# Patient Record
Sex: MALE | Race: WHITE | NOT HISPANIC OR LATINO | Employment: OTHER | URBAN - METROPOLITAN AREA
[De-identification: names, ages, dates, MRNs, and addresses within clinical notes are randomized per-mention and may not be internally consistent; named-entity substitution may affect disease eponyms.]

---

## 2020-09-18 ENCOUNTER — LAB REQUISITION (OUTPATIENT)
Dept: LAB | Facility: HOSPITAL | Age: 65
End: 2020-09-18

## 2020-09-18 DIAGNOSIS — Z00.00 ENCOUNTER FOR GENERAL ADULT MEDICAL EXAMINATION WITHOUT ABNORMAL FINDINGS: ICD-10-CM

## 2020-09-18 PROCEDURE — U0004 COV-19 TEST NON-CDC HGH THRU: HCPCS | Performed by: OPHTHALMOLOGY

## 2020-09-19 LAB — SARS-COV-2 RNA RESP QL NAA+PROBE: NOT DETECTED

## 2022-07-07 ENCOUNTER — PRE-ADMISSION TESTING (OUTPATIENT)
Dept: PREADMISSION TESTING | Facility: HOSPITAL | Age: 67
End: 2022-07-07

## 2022-07-07 ENCOUNTER — HOSPITAL ENCOUNTER (OUTPATIENT)
Dept: GENERAL RADIOLOGY | Facility: HOSPITAL | Age: 67
Discharge: HOME | End: 2022-07-07

## 2022-07-07 VITALS
OXYGEN SATURATION: 97 % | DIASTOLIC BLOOD PRESSURE: 79 MMHG | SYSTOLIC BLOOD PRESSURE: 167 MMHG | HEART RATE: 58 BPM | HEIGHT: 68 IN | WEIGHT: 208.6 LBS | RESPIRATION RATE: 16 BRPM | BODY MASS INDEX: 31.61 KG/M2 | TEMPERATURE: 98.2 F

## 2022-07-07 LAB
ALBUMIN SERPL-MCNC: 4.6 G/DL (ref 3.5–5.2)
ALBUMIN/GLOB SERPL: 2.1 G/DL
ALP SERPL-CCNC: 40 U/L (ref 39–117)
ALT SERPL W P-5'-P-CCNC: 10 U/L (ref 1–41)
ANION GAP SERPL CALCULATED.3IONS-SCNC: 8.4 MMOL/L (ref 5–15)
AST SERPL-CCNC: 11 U/L (ref 1–40)
BILIRUB SERPL-MCNC: <0.2 MG/DL (ref 0–1.2)
BUN SERPL-MCNC: 14 MG/DL (ref 8–23)
BUN/CREAT SERPL: 16.3 (ref 7–25)
CALCIUM SPEC-SCNC: 9.6 MG/DL (ref 8.6–10.5)
CHLORIDE SERPL-SCNC: 104 MMOL/L (ref 98–107)
CO2 SERPL-SCNC: 26.6 MMOL/L (ref 22–29)
CREAT SERPL-MCNC: 0.86 MG/DL (ref 0.76–1.27)
DEPRECATED RDW RBC AUTO: 53.9 FL (ref 37–54)
EGFRCR SERPLBLD CKD-EPI 2021: 94.3 ML/MIN/1.73
ERYTHROCYTE [DISTWIDTH] IN BLOOD BY AUTOMATED COUNT: 20.4 % (ref 12.3–15.4)
GLOBULIN UR ELPH-MCNC: 2.2 GM/DL
GLUCOSE SERPL-MCNC: 135 MG/DL (ref 65–99)
HBA1C MFR BLD: 6.4 % (ref 4.8–5.6)
HCT VFR BLD AUTO: 33.6 % (ref 37.5–51)
HGB BLD-MCNC: 10.1 G/DL (ref 13–17.7)
INR PPP: 1.09 (ref 0.9–1.1)
MCH RBC QN AUTO: 22.8 PG (ref 26.6–33)
MCHC RBC AUTO-ENTMCNC: 30.1 G/DL (ref 31.5–35.7)
MCV RBC AUTO: 75.8 FL (ref 79–97)
PLATELET # BLD AUTO: 431 10*3/MM3 (ref 140–450)
PMV BLD AUTO: 8.6 FL (ref 6–12)
POTASSIUM SERPL-SCNC: 4.4 MMOL/L (ref 3.5–5.2)
PROT SERPL-MCNC: 6.8 G/DL (ref 6–8.5)
PROTHROMBIN TIME: 14 SECONDS (ref 11.7–14.2)
RBC # BLD AUTO: 4.43 10*6/MM3 (ref 4.14–5.8)
SODIUM SERPL-SCNC: 139 MMOL/L (ref 136–145)
WBC NRBC COR # BLD: 8.67 10*3/MM3 (ref 3.4–10.8)

## 2022-07-07 PROCEDURE — 73502 X-RAY EXAM HIP UNI 2-3 VIEWS: CPT

## 2022-07-07 PROCEDURE — 80053 COMPREHEN METABOLIC PANEL: CPT

## 2022-07-07 PROCEDURE — 83036 HEMOGLOBIN GLYCOSYLATED A1C: CPT

## 2022-07-07 PROCEDURE — 85610 PROTHROMBIN TIME: CPT

## 2022-07-07 PROCEDURE — 85027 COMPLETE CBC AUTOMATED: CPT

## 2022-07-07 PROCEDURE — 36415 COLL VENOUS BLD VENIPUNCTURE: CPT

## 2022-07-07 RX ORDER — OMEPRAZOLE 40 MG/1
40 CAPSULE, DELAYED RELEASE ORAL EVERY EVENING
COMMUNITY
Start: 2022-05-12

## 2022-07-07 RX ORDER — LISINOPRIL 30 MG/1
30 TABLET ORAL DAILY
COMMUNITY
Start: 2022-05-31

## 2022-07-07 RX ORDER — CHLORHEXIDINE GLUCONATE 500 MG/1
CLOTH TOPICAL TAKE AS DIRECTED
COMMUNITY
End: 2022-07-21 | Stop reason: HOSPADM

## 2022-07-07 RX ORDER — METOPROLOL SUCCINATE 50 MG/1
50 TABLET, EXTENDED RELEASE ORAL DAILY
COMMUNITY
Start: 2022-02-15 | End: 2023-02-10

## 2022-07-07 RX ORDER — ROSUVASTATIN CALCIUM 40 MG/1
40 TABLET, COATED ORAL NIGHTLY
COMMUNITY
Start: 2022-05-12

## 2022-07-07 RX ORDER — ASPIRIN 81 MG/1
81 TABLET ORAL DAILY
COMMUNITY
End: 2022-07-21 | Stop reason: HOSPADM

## 2022-07-07 RX ORDER — GLIMEPIRIDE 2 MG/1
2 TABLET ORAL
COMMUNITY
Start: 2022-05-12

## 2022-07-07 ASSESSMENT — HOOS JR
HOOS JR SCORE: 18
HOOS JR SCORE: 32.735

## 2022-07-07 NOTE — DISCHARGE INSTRUCTIONS
CHLORHEXIDINE CLOTH INSTRUCTIONS  The morning of surgery follow these instructions using the Chlorhexidine cloths you've been given.  These steps reduce bacteria on the body.  Do not use the cloths near your eyes, ears mouth, genitalia or on open wounds.  Throw the cloths away after use but do not try to flush them down a toilet.      Open and remove one cloth at a time from the package.    Leave the cloth unfolded and begin the bathing.  Massage the skin with the cloths using gentle pressure to remove bacteria.  Do not scrub harshly.   Follow the steps below with one 2% CHG cloth per area (6 total cloths).  One cloth for neck, shoulders and chest.  One cloth for both arms, hands, fingers and underarms (do underarms last).  One cloth for the abdomen followed by groin.  One cloth for right leg and foot including between the toes.  One cloth for left leg and foot including between the toes.  The last cloth is to be used for the back of the neck, back and buttocks.    Allow the CHG to air dry 3 minutes on the skin which will give it time to work and decrease the chance of irritation.  The skin may feel sticky until it is dry.  Do not rinse with water or any other liquid or you will lose the beneficial effects of the CHG.  If mild skin irritation occurs, do rinse the skin to remove the CHG.  Report this to the nurse at time of admission.  Do not apply lotions, creams, ointments, deodorants or perfumes after using the clothes. Dress in clean clothes before coming to the hospital.     Take the following medications the morning of surgery: METOPROLOL    ARRIVE AT 5:15 AM      If you are on prescription narcotic pain medication to control your pain you may also take that medication the morning of surgery.    General Instructions:  Do not eat solid food after midnight the night before surgery.  You may drink clear liquids day of surgery but must stop at least one hour before your hospital arrival time. CUTOFF TIME IS 4:30  AM.  It is beneficial for you to have a clear drink that contains carbohydrates the day of surgery.  We suggest a 12 to 20 ounce bottle of Gatorade or Powerade for non-diabetic patients or a 12 to 20 ounce bottle of G2 or Powerade Zero for diabetic patients. (Pediatric patients, are not advised to drink a 12 to 20 ounce carbohydrate drink)    Clear liquids are liquids you can see through.  Nothing red in color.     Plain water                               Sports drinks  Sodas                                   Gelatin (Jell-O)  Fruit juices without pulp such as white grape juice and apple juice  Popsicles that contain no fruit or yogurt  Tea or coffee (no cream or milk added)  Gatorade / Powerade  G2 / Powerade Zero    Patients who avoid smoking, chewing tobacco and alcohol for 4 weeks prior to surgery have a reduced risk of post-operative complications.  Quit smoking as many days before surgery as you can.  Do not smoke, use chewing tobacco or drink alcohol the day of surgery.   If applicable bring your C-PAP/ BI-PAP machine.  Bring any papers given to you in the doctor’s office.  Wear clean comfortable clothes.  Do not wear contact lenses, false eyelashes or make-up.  Bring a case for your glasses.   Bring crutches or walker if applicable.  Remove all piercings.  Leave jewelry and any other valuables at home.  Hair extensions with metal clips must be removed prior to surgery.  The Pre-Admission Testing nurse will instruct you to bring medications if unable to obtain an accurate list in Pre-Admission Testing.          Preventing a Surgical Site Infection:  For 2 to 3 days before surgery, avoid shaving with a razor because the razor can irritate skin and make it easier to develop an infection.    Any areas of open skin can increase the risk of a post-operative wound infection by allowing bacteria to enter and travel throughout the body.  Notify your surgeon if you have any skin wounds / rashes even if it is not near  the expected surgical site.  The area will need assessed to determine if surgery should be delayed until it is healed.  The night prior to surgery shower using a fresh bar of anti-bacterial soap (such as Dial) and clean washcloth.  Sleep in a clean bed with clean clothing.  Do not allow pets to sleep with you.  Shower on the morning of surgery using a fresh bar of anti-bacterial soap (such as Dial) and clean washcloth.  Dry with a clean towel and dress in clean clothing.  Ask your surgeon if you will be receiving antibiotics prior to surgery.  Make sure you, your family, and all healthcare providers clean their hands with soap and water or an alcohol based hand  before caring for you or your wound.    Day of surgery:  Your arrival time is approximately two hours before your scheduled surgery time.  Upon arrival, a Pre-op nurse and Anesthesiologist will review your health history, obtain vital signs, and answer questions you may have.  The only belongings needed at this time will be a list of your home medications and if applicable your C-PAP/BI-PAP machine.  A Pre-op nurse will start an IV and you may receive medication in preparation for surgery, including something to help you relax.     Please be aware that surgery does come with discomfort.  We want to make every effort to control your discomfort so please discuss any uncontrolled symptoms with your nurse.   Your doctor will most likely have prescribed pain medications.      If you are going home after surgery you will receive individualized written care instructions before being discharged.  A responsible adult must drive you to and from the hospital on the day of your surgery and stay with you for 24 hours.  Discharge prescriptions can be filled by the hospital pharmacy during regular pharmacy hours.  If you are having surgery late in the day/evening your prescription may be e-prescribed to your pharmacy.  Please verify your pharmacy hours or chose a  24 hour pharmacy to avoid not having access to your prescription because your pharmacy has closed for the day.    If you are staying overnight following surgery, you will be transported to your hospital room following the recovery period.  [facility] has all private rooms.    If you have any questions please call Pre-Admission Testing at [phone].  Deductibles and co-payments are collected on the day of service. Please be prepared to pay the required co-pay, deductible or deposit on the day of service as defined by your plan.    Patient Education for Self-Quarantine Process    Following your COVID testing, we strongly recommend that you wear a mask when you are with other people and practice social distancing.   Limit your activities to only required outings.  Wash your hands with soap and water frequently for at least 20 seconds.   Avoid touching your eyes, nose and mouth with unwashed hands.  Do not share anything - utensils, drinking glasses, food from the same bowl.   Sanitize household surfaces daily. Include all high touch areas (door handles, light switches, phones, countertops, etc.)    Call your surgeon immediately if you experience any of the following symptoms:  Sore Throat  Shortness of Breath or difficulty breathing  Cough  Chills  Body soreness or muscle pain  Headache  Fever  New loss of taste or smell  Do not arrive for your surgery ill.  Your procedure will need to be rescheduled to another time.  You will need to call your physician before the day of surgery to avoid any unnecessary exposure to hospital staff as well as other patients.

## 2022-07-08 NOTE — PAT
Eval by APRN in [facility] for sched RTHA becca Buenrostro.  Has had prev TJR with no issues.  Does have h/o CAD with PTCA and placement of drug eluting stent.  Saw cardiology for pre surgical eval and stress test and echo done.  Results in Care Everywhere and cleared by cardiology.  Other PMH includes HTN, DM, hyperlipidemia, reflux, DDD, BPH and OA.  PCP is treating him for some anemia with oral iron currently.  States feeling well overall other than some fatigue which has improved slightly since starting the iron.  Only pain is in his hip.  Denies any sx of recent acute illness.  Exam unremarkable.  EKG and cardiology note/testing reviewed.  Labs continue to show anemia with H/H 10.1/33.6.  Discussed increasing iron to daily (currently taking qod) and taking with citrus to assist absorption.  HgbA1c 6.4.   No contraindication for surgery.

## 2022-07-14 ENCOUNTER — TELEPHONE (OUTPATIENT)
Dept: ORTHOPEDIC SURGERY | Facility: HOSPITAL | Age: 67
End: 2022-07-14

## 2022-07-19 ENCOUNTER — LAB (OUTPATIENT)
Dept: LAB | Facility: HOSPITAL | Age: 67
End: 2022-07-19

## 2022-07-19 LAB — SARS-COV-2 ORF1AB RESP QL NAA+PROBE: NOT DETECTED

## 2022-07-19 PROCEDURE — U0004 COV-19 TEST NON-CDC HGH THRU: HCPCS

## 2022-07-19 PROCEDURE — C9803 HOPD COVID-19 SPEC COLLECT: HCPCS

## 2022-07-19 PROCEDURE — U0005 INFEC AGEN DETEC AMPLI PROBE: HCPCS

## 2022-07-19 NOTE — DISCHARGE PLACEMENT REQUEST
"Reji Max (67 y.o. Male)             Date of Birth   1955    Social Security Number       Address   [address] [address]    Home Phone   [phone]    MRN   [id number]       Lutheran   Hinduism    Marital Status                               Admission Date       Admission Type   Elective    Admitting Provider   Pasquale Buenrostro MD    Attending Provider   Pasquale Buenrostro MD    Department, Room/Bed   [facility] OR, --/--       Discharge Date       Discharge Disposition       Discharge Destination                               Attending Provider: Pasquale Buenrostro MD    Allergies: Erythromycin, Keflex [Cephalexin]    Isolation: None   Infection: COVID Screen (preop/placement) (07/07/22)   Code Status: Not on file   Advance Care Planning Activity    Ht: 172.7 cm (68\")   Wt: 94.6 kg (208 lb 9.6 oz)    Admission Cmt: None   Principal Problem: None                Active Insurance as of 7/20/2022     Primary Coverage     Payor Plan Insurance Group Employer/Plan Group    MEDICARE MEDICARE A & B      Payor Plan Address Payor Plan Phone Number Payor Plan Fax Number Effective Dates    [address] [phone]  4/1/2019 - None Entered    [facility] [id number]       Subscriber Name Subscriber Birth Date Member ID       REJI MAX 1955 [id number]           Secondary Coverage     Payor Plan Insurance Group Employer/Plan Group    [facility] SU[facility] [id number]     Payor Plan Address Payor Plan Phone Number Payor Plan Fax Number Effective Dates    [address]   1/1/2020 - None Entered    [facility] [id number]       Subscriber Name Subscriber Birth Date Member ID       REJI MAX 1955 [id number]                 Emergency Contacts      (Rel.) Home Phone Work Phone Mobile Phone    BRIGHT MAX (Spouse) -- -- [phone]            "

## 2022-07-20 ENCOUNTER — APPOINTMENT (OUTPATIENT)
Dept: GENERAL RADIOLOGY | Facility: HOSPITAL | Age: 67
End: 2022-07-20

## 2022-07-20 ENCOUNTER — HOSPITAL ENCOUNTER (OUTPATIENT)
Facility: HOSPITAL | Age: 67
LOS: 1 days | Discharge: HOME HEALTH | End: 2022-07-21
Attending: ORTHOPAEDIC SURGERY | Admitting: ORTHOPAEDIC SURGERY

## 2022-07-20 ENCOUNTER — ANESTHESIA EVENT (OUTPATIENT)
Dept: PERIOP | Facility: HOSPITAL | Age: 67
End: 2022-07-20

## 2022-07-20 ENCOUNTER — ANESTHESIA (OUTPATIENT)
Dept: PERIOP | Facility: HOSPITAL | Age: 67
End: 2022-07-20

## 2022-07-20 DIAGNOSIS — Z96.641 HISTORY OF TOTAL RIGHT HIP REPLACEMENT: Primary | ICD-10-CM

## 2022-07-20 LAB
GLUCOSE BLDC GLUCOMTR-MCNC: 270 MG/DL (ref 70–130)
GLUCOSE BLDC GLUCOMTR-MCNC: 278 MG/DL (ref 70–130)
HCT VFR BLD AUTO: 31.4 % (ref 37.5–51)
HGB BLD-MCNC: 9.4 G/DL (ref 13–17.7)

## 2022-07-20 PROCEDURE — 97110 THERAPEUTIC EXERCISES: CPT

## 2022-07-20 PROCEDURE — A9270 NON-COVERED ITEM OR SERVICE: HCPCS | Performed by: ORTHOPAEDIC SURGERY

## 2022-07-20 PROCEDURE — 25010000002 DEXAMETHASONE PER 1 MG: Performed by: NURSE ANESTHETIST, CERTIFIED REGISTERED

## 2022-07-20 PROCEDURE — G0378 HOSPITAL OBSERVATION PER HR: HCPCS

## 2022-07-20 PROCEDURE — 25010000002 ONDANSETRON PER 1 MG: Performed by: NURSE ANESTHETIST, CERTIFIED REGISTERED

## 2022-07-20 PROCEDURE — 25010000002 CEFAZOLIN PER 500 MG: Performed by: ORTHOPAEDIC SURGERY

## 2022-07-20 PROCEDURE — 25010000002 KETOROLAC TROMETHAMINE PER 15 MG: Performed by: ORTHOPAEDIC SURGERY

## 2022-07-20 PROCEDURE — 97161 PT EVAL LOW COMPLEX 20 MIN: CPT

## 2022-07-20 PROCEDURE — 25010000002 EPINEPHRINE 1 MG/ML SOLUTION 30 ML VIAL: Performed by: ORTHOPAEDIC SURGERY

## 2022-07-20 PROCEDURE — 97530 THERAPEUTIC ACTIVITIES: CPT

## 2022-07-20 PROCEDURE — 73501 X-RAY EXAM HIP UNI 1 VIEW: CPT

## 2022-07-20 PROCEDURE — 25010000002 CLONIDINE PER 1 MG: Performed by: ORTHOPAEDIC SURGERY

## 2022-07-20 PROCEDURE — 25010000002 ROPIVACAINE PER 1 MG: Performed by: ORTHOPAEDIC SURGERY

## 2022-07-20 PROCEDURE — 63710000001 ASPIRIN 81 MG TABLET DELAYED-RELEASE: Performed by: ORTHOPAEDIC SURGERY

## 2022-07-20 PROCEDURE — 85014 HEMATOCRIT: CPT | Performed by: ORTHOPAEDIC SURGERY

## 2022-07-20 PROCEDURE — C1776 JOINT DEVICE (IMPLANTABLE): HCPCS | Performed by: ORTHOPAEDIC SURGERY

## 2022-07-20 PROCEDURE — 25010000002 MIDAZOLAM PER 1 MG: Performed by: NURSE ANESTHETIST, CERTIFIED REGISTERED

## 2022-07-20 PROCEDURE — 63710000001 INSULIN LISPRO (HUMAN) PER 5 UNITS: Performed by: ORTHOPAEDIC SURGERY

## 2022-07-20 PROCEDURE — 25010000002 CEFAZOLIN IN DEXTROSE 2-4 GM/100ML-% SOLUTION: Performed by: ORTHOPAEDIC SURGERY

## 2022-07-20 PROCEDURE — 25010000002 FENTANYL CITRATE (PF) 50 MCG/ML SOLUTION: Performed by: NURSE ANESTHETIST, CERTIFIED REGISTERED

## 2022-07-20 PROCEDURE — 85018 HEMOGLOBIN: CPT | Performed by: ORTHOPAEDIC SURGERY

## 2022-07-20 PROCEDURE — 76000 FLUOROSCOPY <1 HR PHYS/QHP: CPT

## 2022-07-20 PROCEDURE — 63710000001 HYDROCODONE-ACETAMINOPHEN 7.5-325 MG TABLET: Performed by: ORTHOPAEDIC SURGERY

## 2022-07-20 PROCEDURE — 82962 GLUCOSE BLOOD TEST: CPT

## 2022-07-20 PROCEDURE — 25010000002 PROPOFOL 10 MG/ML EMULSION: Performed by: NURSE ANESTHETIST, CERTIFIED REGISTERED

## 2022-07-20 DEVICE — DEV CONTRL TISS STRATAFIX SPIRAL MNCRYL UD 3/0 PLS 30CM: Type: IMPLANTABLE DEVICE | Site: HIP | Status: FUNCTIONAL

## 2022-07-20 DEVICE — STEM FEM HIP TAPERLOC COMPL MICROPLASTY HI OFFST SZ11: Type: IMPLANTABLE DEVICE | Site: HIP | Status: FUNCTIONAL

## 2022-07-20 DEVICE — SHLL ACET OSSEOTI G7 4H SZF 56MM: Type: IMPLANTABLE DEVICE | Site: HIP | Status: FUNCTIONAL

## 2022-07-20 DEVICE — IMPLANTABLE DEVICE
Type: IMPLANTABLE DEVICE | Site: HIP | Status: FUNCTIONAL
Brand: BIOLOX® DELTA MODULAR CERAMIC HEAD

## 2022-07-20 DEVICE — TOTAL HIP PRIMARY: Type: IMPLANTABLE DEVICE | Site: HIP | Status: FUNCTIONAL

## 2022-07-20 DEVICE — DEV CONTRL TISS STRATAFIX SYMM PDS PLUS VIL CT-1 45CM: Type: IMPLANTABLE DEVICE | Site: HIP | Status: FUNCTIONAL

## 2022-07-20 DEVICE — LINER ACET G7 VIVACIT/E NTRL HXPE SZF 36MM: Type: IMPLANTABLE DEVICE | Site: HIP | Status: FUNCTIONAL

## 2022-07-20 DEVICE — CP HIP UPCHRG OSSEOTI LTD HL CUPS: Type: IMPLANTABLE DEVICE | Site: HIP | Status: FUNCTIONAL

## 2022-07-20 RX ORDER — LABETALOL HYDROCHLORIDE 5 MG/ML
5 INJECTION, SOLUTION INTRAVENOUS
Status: DISCONTINUED | OUTPATIENT
Start: 2022-07-20 | End: 2022-07-20 | Stop reason: HOSPADM

## 2022-07-20 RX ORDER — ONDANSETRON 2 MG/ML
4 INJECTION INTRAMUSCULAR; INTRAVENOUS ONCE AS NEEDED
Status: DISCONTINUED | OUTPATIENT
Start: 2022-07-20 | End: 2022-07-20 | Stop reason: HOSPADM

## 2022-07-20 RX ORDER — ASPIRIN 81 MG/1
81 TABLET ORAL EVERY 12 HOURS SCHEDULED
Status: DISCONTINUED | OUTPATIENT
Start: 2022-07-20 | End: 2022-07-21 | Stop reason: HOSPADM

## 2022-07-20 RX ORDER — FENTANYL CITRATE 50 UG/ML
INJECTION, SOLUTION INTRAMUSCULAR; INTRAVENOUS AS NEEDED
Status: DISCONTINUED | OUTPATIENT
Start: 2022-07-20 | End: 2022-07-20 | Stop reason: SURG

## 2022-07-20 RX ORDER — FENTANYL CITRATE 50 UG/ML
50 INJECTION, SOLUTION INTRAMUSCULAR; INTRAVENOUS
Status: DISCONTINUED | OUTPATIENT
Start: 2022-07-20 | End: 2022-07-20 | Stop reason: HOSPADM

## 2022-07-20 RX ORDER — HYDROCODONE BITARTRATE AND ACETAMINOPHEN 7.5; 325 MG/1; MG/1
1 TABLET ORAL EVERY 4 HOURS PRN
Status: DISCONTINUED | OUTPATIENT
Start: 2022-07-20 | End: 2022-07-21 | Stop reason: HOSPADM

## 2022-07-20 RX ORDER — HYDROCODONE BITARTRATE AND ACETAMINOPHEN 7.5; 325 MG/1; MG/1
1 TABLET ORAL EVERY 6 HOURS PRN
Qty: 50 TABLET | Refills: 0 | Status: SHIPPED | OUTPATIENT
Start: 2022-07-20

## 2022-07-20 RX ORDER — TRAMADOL HYDROCHLORIDE 50 MG/1
50 TABLET ORAL EVERY 8 HOURS PRN
Status: DISCONTINUED | OUTPATIENT
Start: 2022-07-20 | End: 2022-07-21 | Stop reason: HOSPADM

## 2022-07-20 RX ORDER — HYDROCODONE BITARTRATE AND ACETAMINOPHEN 7.5; 325 MG/1; MG/1
2 TABLET ORAL EVERY 4 HOURS PRN
Status: DISCONTINUED | OUTPATIENT
Start: 2022-07-20 | End: 2022-07-21 | Stop reason: HOSPADM

## 2022-07-20 RX ORDER — HYDROCODONE BITARTRATE AND ACETAMINOPHEN 5; 325 MG/1; MG/1
1 TABLET ORAL ONCE AS NEEDED
Status: COMPLETED | OUTPATIENT
Start: 2022-07-20 | End: 2022-07-20

## 2022-07-20 RX ORDER — MIDAZOLAM HYDROCHLORIDE 1 MG/ML
INJECTION INTRAMUSCULAR; INTRAVENOUS AS NEEDED
Status: DISCONTINUED | OUTPATIENT
Start: 2022-07-20 | End: 2022-07-20 | Stop reason: SURG

## 2022-07-20 RX ORDER — BUPIVACAINE HYDROCHLORIDE 7.5 MG/ML
INJECTION, SOLUTION EPIDURAL; RETROBULBAR
Status: COMPLETED | OUTPATIENT
Start: 2022-07-20 | End: 2022-07-20

## 2022-07-20 RX ORDER — DEXAMETHASONE SODIUM PHOSPHATE 4 MG/ML
INJECTION, SOLUTION INTRA-ARTICULAR; INTRALESIONAL; INTRAMUSCULAR; INTRAVENOUS; SOFT TISSUE AS NEEDED
Status: DISCONTINUED | OUTPATIENT
Start: 2022-07-20 | End: 2022-07-20 | Stop reason: SURG

## 2022-07-20 RX ORDER — SODIUM CHLORIDE 0.9 % (FLUSH) 0.9 %
3-10 SYRINGE (ML) INJECTION AS NEEDED
Status: DISCONTINUED | OUTPATIENT
Start: 2022-07-20 | End: 2022-07-20 | Stop reason: HOSPADM

## 2022-07-20 RX ORDER — LIDOCAINE HYDROCHLORIDE 10 MG/ML
0.5 INJECTION, SOLUTION EPIDURAL; INFILTRATION; INTRACAUDAL; PERINEURAL ONCE AS NEEDED
Status: DISCONTINUED | OUTPATIENT
Start: 2022-07-20 | End: 2022-07-20 | Stop reason: HOSPADM

## 2022-07-20 RX ORDER — CEFADROXIL 500 MG/1
500 CAPSULE ORAL 2 TIMES DAILY
Qty: 3 CAPSULE | Refills: 0 | Status: SHIPPED | OUTPATIENT
Start: 2022-07-20

## 2022-07-20 RX ORDER — NALOXONE HCL 0.4 MG/ML
0.4 VIAL (ML) INJECTION AS NEEDED
Status: DISCONTINUED | OUTPATIENT
Start: 2022-07-20 | End: 2022-07-20 | Stop reason: HOSPADM

## 2022-07-20 RX ORDER — DEXTROSE MONOHYDRATE 25 G/50ML
25 INJECTION, SOLUTION INTRAVENOUS
Status: DISCONTINUED | OUTPATIENT
Start: 2022-07-20 | End: 2022-07-21 | Stop reason: HOSPADM

## 2022-07-20 RX ORDER — MIDAZOLAM HYDROCHLORIDE 1 MG/ML
0.5 INJECTION INTRAMUSCULAR; INTRAVENOUS
Status: DISCONTINUED | OUTPATIENT
Start: 2022-07-20 | End: 2022-07-20 | Stop reason: HOSPADM

## 2022-07-20 RX ORDER — OXYCODONE AND ACETAMINOPHEN 7.5; 325 MG/1; MG/1
1 TABLET ORAL ONCE AS NEEDED
Status: DISCONTINUED | OUTPATIENT
Start: 2022-07-20 | End: 2022-07-20 | Stop reason: HOSPADM

## 2022-07-20 RX ORDER — PREGABALIN 75 MG/1
150 CAPSULE ORAL ONCE
Status: COMPLETED | OUTPATIENT
Start: 2022-07-20 | End: 2022-07-20

## 2022-07-20 RX ORDER — SODIUM CHLORIDE 0.9 % (FLUSH) 0.9 %
3 SYRINGE (ML) INJECTION EVERY 12 HOURS SCHEDULED
Status: DISCONTINUED | OUTPATIENT
Start: 2022-07-20 | End: 2022-07-20 | Stop reason: HOSPADM

## 2022-07-20 RX ORDER — DIPHENHYDRAMINE HYDROCHLORIDE 50 MG/ML
12.5 INJECTION INTRAMUSCULAR; INTRAVENOUS
Status: DISCONTINUED | OUTPATIENT
Start: 2022-07-20 | End: 2022-07-20 | Stop reason: HOSPADM

## 2022-07-20 RX ORDER — INSULIN LISPRO 100 [IU]/ML
0-7 INJECTION, SOLUTION INTRAVENOUS; SUBCUTANEOUS
Status: DISCONTINUED | OUTPATIENT
Start: 2022-07-20 | End: 2022-07-21 | Stop reason: HOSPADM

## 2022-07-20 RX ORDER — TRANEXAMIC ACID 100 MG/ML
INJECTION, SOLUTION INTRAVENOUS AS NEEDED
Status: DISCONTINUED | OUTPATIENT
Start: 2022-07-20 | End: 2022-07-20 | Stop reason: SURG

## 2022-07-20 RX ORDER — PROPOFOL 10 MG/ML
VIAL (ML) INTRAVENOUS CONTINUOUS PRN
Status: DISCONTINUED | OUTPATIENT
Start: 2022-07-20 | End: 2022-07-20 | Stop reason: SURG

## 2022-07-20 RX ORDER — INSULIN LISPRO 100 [IU]/ML
0-7 INJECTION, SOLUTION INTRAVENOUS; SUBCUTANEOUS
Status: DISCONTINUED | OUTPATIENT
Start: 2022-07-21 | End: 2022-07-20

## 2022-07-20 RX ORDER — HYDROMORPHONE HYDROCHLORIDE 1 MG/ML
0.25 INJECTION, SOLUTION INTRAMUSCULAR; INTRAVENOUS; SUBCUTANEOUS
Status: DISCONTINUED | OUTPATIENT
Start: 2022-07-20 | End: 2022-07-20 | Stop reason: HOSPADM

## 2022-07-20 RX ORDER — ONDANSETRON 4 MG/1
4 TABLET, FILM COATED ORAL ONCE AS NEEDED
Status: DISCONTINUED | OUTPATIENT
Start: 2022-07-20 | End: 2022-07-21 | Stop reason: HOSPADM

## 2022-07-20 RX ORDER — ENALAPRILAT 2.5 MG/2ML
1.25 INJECTION INTRAVENOUS ONCE AS NEEDED
Status: DISCONTINUED | OUTPATIENT
Start: 2022-07-20 | End: 2022-07-20 | Stop reason: HOSPADM

## 2022-07-20 RX ORDER — ACETAMINOPHEN 500 MG
1000 TABLET ORAL ONCE
Status: COMPLETED | OUTPATIENT
Start: 2022-07-20 | End: 2022-07-20

## 2022-07-20 RX ORDER — TRAMADOL HYDROCHLORIDE 50 MG/1
50 TABLET ORAL EVERY 8 HOURS PRN
Qty: 40 TABLET | Refills: 0 | Status: SHIPPED | OUTPATIENT
Start: 2022-07-20

## 2022-07-20 RX ORDER — CEFAZOLIN SODIUM 2 G/100ML
2 INJECTION, SOLUTION INTRAVENOUS ONCE
Status: COMPLETED | OUTPATIENT
Start: 2022-07-20 | End: 2022-07-20

## 2022-07-20 RX ORDER — NICOTINE POLACRILEX 4 MG
15 LOZENGE BUCCAL
Status: DISCONTINUED | OUTPATIENT
Start: 2022-07-20 | End: 2022-07-21 | Stop reason: HOSPADM

## 2022-07-20 RX ORDER — ONDANSETRON 4 MG/1
4 TABLET, FILM COATED ORAL EVERY 8 HOURS PRN
Qty: 12 TABLET | Refills: 0 | Status: SHIPPED | OUTPATIENT
Start: 2022-07-20

## 2022-07-20 RX ORDER — CELECOXIB 200 MG/1
200 CAPSULE ORAL ONCE
Status: COMPLETED | OUTPATIENT
Start: 2022-07-20 | End: 2022-07-20

## 2022-07-20 RX ORDER — CEFAZOLIN SODIUM 2 G/100ML
2 INJECTION, SOLUTION INTRAVENOUS EVERY 8 HOURS
Status: COMPLETED | OUTPATIENT
Start: 2022-07-20 | End: 2022-07-21

## 2022-07-20 RX ORDER — SODIUM CHLORIDE, SODIUM LACTATE, POTASSIUM CHLORIDE, CALCIUM CHLORIDE 600; 310; 30; 20 MG/100ML; MG/100ML; MG/100ML; MG/100ML
9 INJECTION, SOLUTION INTRAVENOUS CONTINUOUS
Status: DISCONTINUED | OUTPATIENT
Start: 2022-07-20 | End: 2022-07-21 | Stop reason: HOSPADM

## 2022-07-20 RX ORDER — FAMOTIDINE 10 MG/ML
20 INJECTION, SOLUTION INTRAVENOUS ONCE
Status: COMPLETED | OUTPATIENT
Start: 2022-07-20 | End: 2022-07-20

## 2022-07-20 RX ORDER — ASPIRIN 81 MG/1
81 TABLET ORAL 2 TIMES DAILY
Qty: 60 TABLET | Refills: 0 | Status: SHIPPED | OUTPATIENT
Start: 2022-07-20 | End: 2022-08-19

## 2022-07-20 RX ORDER — CELECOXIB 100 MG/1
100 CAPSULE ORAL 2 TIMES DAILY
Qty: 60 CAPSULE | Refills: 0 | Status: SHIPPED | OUTPATIENT
Start: 2022-07-20

## 2022-07-20 RX ORDER — ONDANSETRON 2 MG/ML
INJECTION INTRAMUSCULAR; INTRAVENOUS AS NEEDED
Status: DISCONTINUED | OUTPATIENT
Start: 2022-07-20 | End: 2022-07-20 | Stop reason: SURG

## 2022-07-20 RX ADMIN — FAMOTIDINE 20 MG: 10 INJECTION INTRAVENOUS at 06:35

## 2022-07-20 RX ADMIN — INSULIN LISPRO 4 UNITS: 100 INJECTION, SOLUTION INTRAVENOUS; SUBCUTANEOUS at 21:53

## 2022-07-20 RX ADMIN — PREGABALIN 150 MG: 75 CAPSULE ORAL at 05:48

## 2022-07-20 RX ADMIN — DEXAMETHASONE SODIUM PHOSPHATE 8 MG: 4 INJECTION, SOLUTION INTRAMUSCULAR; INTRAVENOUS at 07:49

## 2022-07-20 RX ADMIN — TRANEXAMIC ACID 1000 MG: 1 INJECTION, SOLUTION INTRAVENOUS at 09:10

## 2022-07-20 RX ADMIN — FENTANYL CITRATE 50 MCG: 0.05 INJECTION, SOLUTION INTRAMUSCULAR; INTRAVENOUS at 07:04

## 2022-07-20 RX ADMIN — ONDANSETRON 4 MG: 2 INJECTION INTRAMUSCULAR; INTRAVENOUS at 09:22

## 2022-07-20 RX ADMIN — PROPOFOL 80 MCG/KG/MIN: 10 INJECTION, EMULSION INTRAVENOUS at 07:32

## 2022-07-20 RX ADMIN — BUPIVACAINE HYDROCHLORIDE 1.4 ML: 7.5 INJECTION, SOLUTION EPIDURAL; RETROBULBAR at 07:29

## 2022-07-20 RX ADMIN — SODIUM CHLORIDE, POTASSIUM CHLORIDE, SODIUM LACTATE AND CALCIUM CHLORIDE: 600; 310; 30; 20 INJECTION, SOLUTION INTRAVENOUS at 07:01

## 2022-07-20 RX ADMIN — HYDROCODONE BITARTRATE AND ACETAMINOPHEN 1 TABLET: 5; 325 TABLET ORAL at 15:44

## 2022-07-20 RX ADMIN — TRANEXAMIC ACID 1000 MG: 1 INJECTION, SOLUTION INTRAVENOUS at 07:50

## 2022-07-20 RX ADMIN — MIDAZOLAM 2 MG: 1 INJECTION INTRAMUSCULAR; INTRAVENOUS at 07:09

## 2022-07-20 RX ADMIN — CEFAZOLIN SODIUM 2 G: 2 INJECTION, SOLUTION INTRAVENOUS at 07:00

## 2022-07-20 RX ADMIN — CELECOXIB 200 MG: 200 CAPSULE ORAL at 05:48

## 2022-07-20 RX ADMIN — ASPIRIN 81 MG: 81 TABLET, COATED ORAL at 21:53

## 2022-07-20 RX ADMIN — ACETAMINOPHEN 1000 MG: 500 TABLET ORAL at 05:48

## 2022-07-20 RX ADMIN — CEFAZOLIN 2 G: 10 INJECTION, POWDER, FOR SOLUTION INTRAVENOUS at 21:41

## 2022-07-20 RX ADMIN — HYDROCODONE BITARTRATE AND ACETAMINOPHEN 1 TABLET: 7.5; 325 TABLET ORAL at 22:01

## 2022-07-20 RX ADMIN — FENTANYL CITRATE 50 MCG: 0.05 INJECTION, SOLUTION INTRAMUSCULAR; INTRAVENOUS at 07:25

## 2022-07-20 NOTE — DISCHARGE INSTRUCTIONS
Dr. Pasquale Buenrostro  Anterior Total Hip Replacement Discharge Instructions:    Office Phone Number: [phone]    I. ACTIVITIES:  1. Exercises:  Complete exercise program as taught by the hospital physical therapist 2 times per day.  You may wean off the walker to a cane when directed by the physical therapist.  Exercise program will be advanced by the physical therapist  During the day be up ambulating every 2 hours (while awake) for short distances  Complete the ankle pump exercises at least 10 times per hour (while awake)  Elevate legs most of the day the first week post operatively and thereafter elevate legs when in bed and for at least 30 minutes during the day. Use cold packs 20-30 minutes approximately 5 times per day. This should be done before and after completing your exercises and at any time you are experiencing pain/ stiffness in your operative extremity.      2. Activities of Daily Living:  No tub baths, hot tubs, or swimming pools for 4 weeks  The clear dressing with thin white gauze strip dressing is waterproof.  You may shower without covering the dressing beginning 3 days after the operation.  After 7 days you may remove the dressing.  If the dressing becomes saturated prior to day 7, it may be changed.  After dressing removal, do not scrub or rub the incision. Allow skin glue to fall off over the next few weeks.  After the dressing is removed, simply let the water run over the incision and pat dry.    II. Restrictions  Follow any movement restrictions that was discussed with you by either Dr. Buenrostro or the physical therapist.     Dr. Buenrostro will discuss with you when you will be able to drive again at your first post-op appointment.  Weight bearing is as tolerated.  First week stay inside on even terrain. May go up and down stairs one stair at a time utilizing the hand rail.  Once you feel confident, you may venture outside.    III. Precautions:  Everyone that comes near you should wash  their hands  No elective dental, genital-urinary, or colon procedures or surgical procedures for 12 weeks after surgery unless absolutely necessary.   If dental work or surgical procedure is deemed absolutely necessary within 12 weeks of surgery, you will need to contact Dr. Buenrostro's office as you will need to take antibiotics 1 hour prior to any dental work (including teeth cleanings).  Dr. Buenrostro will prescribe prophylactic antibiotics for all dental procedures for one year  as a precautionary measure to minimize risk of infection.  If you are a diabetic or take immunosuppressive medication, you may have to take prophylactic antibiotics the remainder of your life before dental work.    Avoid sick people. If you must be around someone who is ill, they should wear a mask.  Avoid visits to the Emergency Room or Urgent Care unless you are having a life threatening event.   If you have leg swelling you may wear leg compression stocking.   Stockings are to be placed on in the morning and removed at night. Monitor the stockings to ensure that any swelling is not causing the stockings to become too tight. In this case, remove stockings immediately.    IV. INCISION CARE:  Dr. Buenrostro takes great care in closing your incision to give you the best opportunity for a healthy incision with minimal scarring. He places sutures below the skin surface that will eventually dissolve.  The incision is then covered with a skin glue which makes the incision water tight, and minimizes bleeding onto the dressing.  No staples are used.  Occasionally one of the buried stitches may come to the skin surface and may need to be removed.  Please resist the temptation of removing the stitch by yourself.   will be happy to remove it for you.  Bruising around the incision and thigh is normal and to be expected.  Please keep dressing in place at least until post-op day 7. You may remove and replace dressing before day 7 if the dressing  begins to fall off or becomes saturated. Wash your hands and under your finger nails prior to dressing changes.  After day 7 as long as incision is dry and intact, you may leave the dressing off and open to air.  You will need to find underwear that does not rub on the incision for a few weeks after the surgery.  You may find it more comfortable to place a dressing on to keep your underwear from rubbing the incision.       If dressing must be changed, utilize dry gauze and paper tape. Avoid touching the side of the gauze that goes against the incision with your hands.  No creams or ointments to the incision until permission given by Dr. Buenrostro.  Do not touch or pick at the incision, or try to remove any sutures or skin glue.  Check dressing every day and notify surgeon immediately if any of the following signs or symptoms are noted:  Increase in redness  Increase in swelling of the entire extremity that does not go away with elevation.  Notify office that you may have a blood clot.    Drainage oozing from the incision  Pulling apart of the edges of the incision  Increase in overall body temperature (greater than 100.5 degrees)    V. Medications:   1. Anticoagulants: You will be discharged on an anticoagulant. This is a prophylactic medication that helps prevent blood clots during your post-operative period. The type and length of dosage varies based on your individual needs, procedure performed, and Dr. Buenrostro's preference.  While taking the anticoagulant, you should avoid taking any additional aspirin than what is prescribed.   Notify surgeon immediately if any romulo bleeding is noted in the urine, stool, emesis, or from the nose or the incision. Blood in the stool will often appear as black rather than red. Blood in urine may appear as pink. Blood in emesis may appear as brown/black like coffee grounds.  You will need to apply pressure for longer periods of time to any cuts or abrasions to stop bleeding  Avoid  alcohol while taking anticoagulants.    2. Stool Softeners: You will be at greater risk of constipation after surgery due to being less mobile and the pain medications.   Take stool softeners as instructed by your surgeon while on pain medications. Over the counter Colace 100 mg 1-2 capsules twice daily.   If stools become too loose or too frequent, please decreases the dosage or stop the stool softener.  If constipation occurs despite use of stool softeners, you are to continue the stool softeners and add a laxative (Milk of Magnesia 1 ounce daily as needed).  If no bowel movement occurs past 3 days, then purchase Magnesium citrate and drink 1/2 bottle every 8 hours (on ice tastes better) until success. If no bowel movement by post-operative day 5 please call Dr. Penn office for further instructions.   You may need to decrease or stop your pain medications if bowel movements to not occur.     Drink plenty of fluids, and eat fruits and vegetables during your recovery time.    3. Pain Medications utilized after surgery are narcotics and the law requires that the following information be given to all patients that are prescribed narcotics:  CLASSIFICATION: Pain medications are called Opioids and are narcotics  LEGALITIES: It is illegal to share narcotics with others and to drive within 24 hours of taking narcotics  POTENTIAL SIDE EFFECTS: Potential side effects of opioids include: nausea, vomiting, itching, dizziness, drowsiness, dry mouth, constipation, and difficulty urinating.  POTENTIAL ADVERSE EFFECTS:   Opioid tolerance can develop with use of pain medications and this simply means that it requires more and more of the medication to control pain; however, this is seen more in patients that use opioids for longer periods of time.  Opioid dependence can develop with use of Opioids and this simply means that to stop the medication can cause withdrawal symptoms; however, this is seen with patients that use  "Opioids for longer periods of time.  Opioid addiction can develop with use of Opioids and the incidence of this is very unlikely in patients who take the medications as ordered and stop the medications as instructed.  Opioid overdose can be dangerous, but is unlikely when the medication is taken as ordered and stopped when ordered. It is important not to mix opioids with alcohol or with and type of sedative such as Benadryl as this can lead to over sedation and respiratory difficulty.  DOSAGE:   Pain medications will need to be taken consistently for the first few days to decrease pain and promote adequate pain relief and participation in physical therapy.  After the initial surgical pain begins to resolve, you may begin to decrease the pain medication. By the end of 6 weeks, you should be off of pain medications except for before physical therapy or to help with pain when attempting to fall asleep.  Pain medications will be tapered to lesser dosages as you are further from your surgical day.  No pain medications will be provided after 3 months from surgery.     Refills will not be given by the office during evening hours, or weekends.  To seek refills on pain medications during the post-operative period, you must call the office 48 hours in advance to request the refill. The office will then notify you when to  the prescription. DO NOT wait until you are out of the medication to request a refill.  They can not be \"called in\" to the pharmacy.      How to Wean Off Pain Medication:   As you begin to feel better, gradually wean off the narcotic pain medication and begin to use it only for breakthrough pain.  Gradually reduce the total number of pills you take each day.  This can be done by taking fewer pills at a time or by increasing the amount of time between each pill.    For example, if you were taking 2 pills every 6 hours you would be taking a total of 8 pills per day.  Reduce this to 6 pills per day, then " 4-5 and so on.  This can be done by taking 1 pill at a time instead of 2, or by taking the pills every 8 hours instead of every 6.    As you begin to wean from the narcotic pain medication, begin substituting with over the counter tylenol when you are not taking the narcotic.  Limit total tylenol dosage to less than 4 grams per day.    V. FOLLOW-UP VISITS:  You will need to follow up in the office with Dr. Buenrostro at 3 weeks.  Please call [phone] if you need to confirm or reschedule your appointment time.   If you have any concerns or suspected complications prior to your follow up visit, please call your surgeons office. Do not wait until your appointment time if you suspect complications. These will need to be addressed in the office promptly.

## 2022-07-20 NOTE — ANESTHESIA POSTPROCEDURE EVALUATION
Patient: Reji Max Sr.    Procedure Summary     Date: 07/20/22 Room / Location: [facility] OR [address] [place] [address] [facility]    Anesthesia Start: 0700 Anesthesia Stop: 0945    Procedure: TOTAL HIP ARTHROPLASTY ANTERIOR WITH HANA TABLE (Right Hip) Diagnosis:     Surgeons: Pasquale Buenrostro MD Provider: Luis Felipe Sagastume MD    Anesthesia Type: spinal ASA Status: 3          Anesthesia Type: spinal    Vitals  Vitals Value Taken Time   /72 07/20/22 1030   Temp 36.6 °C (97.8 °F) 07/20/22 1036   Pulse 54 07/20/22 1045   Resp 16 07/20/22 1036   SpO2 97 % 07/20/22 1045   Vitals shown include unvalidated device data.        Post Anesthesia Care and Evaluation    Patient location during evaluation: bedside  Patient participation: complete - patient participated  Level of consciousness: awake and alert  Pain score: 0  Pain management: adequate    Airway patency: patent  Anesthetic complications: No anesthetic complications    Cardiovascular status: acceptable  Respiratory status: acceptable  Hydration status: acceptable    Comments: /75 (BP Location: Left arm, Patient Position: Lying)   Pulse 56   Temp 36.6 °C (97.8 °F) (Oral)   Resp 18   SpO2 98%

## 2022-07-20 NOTE — ANESTHESIA PREPROCEDURE EVALUATION
Anesthesia Evaluation     Patient summary reviewed and Nursing notes reviewed   NPO Solid Status: > 8 hours  NPO Liquid Status: > 2 hours           Airway   Mallampati: II  TM distance: >3 FB  Neck ROM: full  Dental - normal exam     Pulmonary - negative pulmonary ROS and normal exam    breath sounds clear to auscultation  Cardiovascular - normal exam    Patient on routine beta blocker  Rhythm: regular  Rate: normal    (+) hypertension 2 medications or greater, past MI  >12 months, CAD, hyperlipidemia,   (-) angina, orthopnea, PND, POSADA      Neuro/Psych- negative ROS  GI/Hepatic/Renal/Endo    (+) obesity,  GERD,  renal disease stones, diabetes mellitus,     Musculoskeletal     Abdominal    Substance History - negative use     OB/GYN negative ob/gyn ROS         Other   arthritis,                      Anesthesia Plan    ASA 3     spinal       Anesthetic plan, risks, benefits, and alternatives have been provided, discussed and informed consent has been obtained with: patient.        CODE STATUS:

## 2022-07-20 NOTE — PLAN OF CARE
Goal Outcome Evaluation:  Plan of Care Reviewed With: patient           Outcome Evaluation: Pt seen again today to attempt transfers and ambulation. Pt continues to have no quad activation s/p spinal block. Pt unable to perform quad set or SLR and unable to bear any weight onto RLE without knee buckling. PT blocking R knee for safety in standing. Remains unsafe to ambulate at this time. PT recommending followup with PT tomorrow before d/c home with assist and HH.

## 2022-07-20 NOTE — THERAPY EVALUATION
Acute Care - Physical Therapy Initial Evaluation  [facility]     Patient Name: Reji Max Sr.  : 1955  MRN: [id number]  Today's Date: 2022   Onset of Illness/Injury or Date of Surgery: 22  Visit Dx:     ICD-10-CM ICD-9-CM   1. History of total right hip replacement  Z96.641 V43.64     There is no problem list on file for this patient.    Past Medical History:   Diagnosis Date   • BPH (benign prostatic hyperplasia)    • DDD (degenerative disc disease), lumbar    • Diabetes mellitus (HCC)    • Elevated cholesterol    • GERD (gastroesophageal reflux disease)    • History of viral meningitis    • Hypertension    • Kidney stone     MICROSCOPIC HEMATURIA   • Myocardial infarction (HCC)     X 2   • Osteoarthritis of right hip      Past Surgical History:   Procedure Laterality Date   • CARDIAC CATHETERIZATION      MULTIPLE   • CORONARY ANGIOPLASTY WITH STENT PLACEMENT     • MINIMALLY INVASIVE MICRODISCECTOMY LUMBAR SPINE     • TONSILLECTOMY     • TOTAL KNEE ARTHROPLASTY Left    • TURP / TRANSURETHRAL INCISION / DRAINAGE PROSTATE       PT Assessment (last 12 hours)     PT Evaluation and Treatment     Row Name 22 1400          Physical Therapy Time and Intention    Subjective Information no complaints  -     Document Type evaluation  -     Mode of Treatment individual therapy;physical therapy  -     Patient Effort excellent  -     Symptoms Noted During/After Treatment none  -     Comment s/p spinal, pt has minimal R quad control during PT eval- RN aware and observed   -     Row Name 22 1400          General Information    Patient Profile Reviewed yes  -     Onset of Illness/Injury or Date of Surgery 22  -     Referring Physician Porter  -     Patient Observations alert;agree to therapy;cooperative  -     General Observations of Patient pt supine on stretcher no acute distress  -     Prior Level of Function independent:  -     Equipment Currently Used at Home  none  -     Pertinent History of Current Functional Problem POD 0 R ant KATIE  -     Existing Precautions/Restrictions hip, anterior;right;fall  -     Equipment Issued to Patient walker, front wheeled  -     Risks Reviewed patient:  -     Benefits Reviewed patient:  -[facility] Name 07/20/22 1400          Pain    Pretreatment Pain Rating 0/10 - no pain  -     Posttreatment Pain Rating 0/10 - no pain  -[facility] Name 07/20/22 1400          Cognition    Affect/Mental Status (Cognition) WFL  -     Orientation Status (Cognition) oriented x 4  -     Follows Commands (Cognition) WNL  -[facility] Name 07/20/22 1400          Range of Motion Comprehensive    Comment, General Range of Motion impaired R knee extension- unable to perform against gravity due to quad weakness, otherwise WNL  -[facility] Name 07/20/22 1400          Strength Comprehensive (MMT)    Comment, General Manual Muscle Testing (MMT) Assessment RLE significantly impaired due to spinal, quad 1/5, otherwise WFL, LLE 5/5  -[facility] Name 07/20/22 1400          Mobility    Extremity Weight-bearing Status right lower extremity  -     Right Lower Extremity (Weight-bearing Status) weight-bearing as tolerated (WBAT)  -[facility] Name 07/20/22 1400          Bed Mobility    Bed Mobility supine-sit;sit-supine  -     Supine-Sit [place] (Bed Mobility) minimum assist (75% patient effort)  -     Sit-Supine [place] (Bed Mobility) minimum assist (75% patient effort)  -     Comment, (Bed Mobility) dependent assist of RLE due to weakness post spinal  -[facility] Name 07/20/22 1400          Transfers    Transfers stand-sit transfer;sit-stand transfer  -     Comment, (Transfers) guarding/blocking R knee due to poor quad control  -     Sit-Stand [place] (Transfers) minimum assist (75% patient effort);verbal cues;nonverbal cues (demo/gesture)  -     Stand-Sit [place] (Transfers) minimum assist (75% patient effort);verbal  cues;nonverbal cues (demo/gesture)  -     Row Name 07/20/22 1400          Sit-Stand Transfer    Assistive Device (Sit-Stand Transfers) walker, front-wheeled  -     Row Name 07/20/22 1400          Stand-Sit Transfer    Assistive Device (Stand-Sit Transfers) walker, front-wheeled  -LH     Row Name 07/20/22 1400          Gait/Stairs (Locomotion)    Comment, (Gait/Stairs) unable to assess  -     Row Name 07/20/22 1400          Balance    Balance Assessment sitting static balance;standing static balance  -     Static Sitting Balance standby assist  -     Position, Sitting Balance unsupported;sitting edge of bed  -     Static Standing Balance minimal assist;verbal cues;non-verbal cues (demo/gesture)  guarding RLE due to quad weakness  -     Position/Device Used, Standing Balance walker, front-wheeled  -     Row Name 07/20/22 1400          Motor Skills    Therapeutic Exercise --  KATIE therex x 5 reps w assist (unable to activate quad)  -     Row Name             Wound 07/20/22 0825 Right anterior hip Incision    Wound - Properties Group Placement Date: 07/20/22  -BC Placement Time: 0825  -BC Side: Right  -BC Orientation: anterior  -BC Location: hip  -BC Primary Wound Type: Incision  -BC     Retired Wound - Properties Group Placement Date: 07/20/22  -BC Placement Time: 0825  -BC Side: Right  -BC Orientation: anterior  -BC Location: hip  -BC Primary Wound Type: Incision  -BC     Retired Wound - Properties Group Date first assessed: 07/20/22  -BC Time first assessed: 0825  -BC Side: Right  -BC Location: hip  -BC Primary Wound Type: Incision  -BC     Row Name 07/20/22 1400          Plan of Care Review    Plan of Care Reviewed With patient  -     Outcome Evaluation Pt 68 yo male POD 0 R ant KATIE s/p spinal, pt w noted minimal quad activation limiting safe mobilization during exam. Pt stood bedside stretcher Min A rwx- PT guarding/blocking RLE to prevent buckling. Pt WBAT however performing minimal WB RLE due  to quad weakness, unable to safely ambulate. Pt completed KATIE therex w assist for performance, PROM LAQ again due to quad weakness. Pt educated on anterior THP. Pt may benefit from skilled PT acutely to address deficits, rwx delivered. RN bedside and observed PT session.  -     Row Name 07/20/22 1400          Positioning and Restraints    Pre-Treatment Position in bed  -     Post Treatment Position bed  -     In Bed fowlers;call light within reach;encouraged to call for assist;notified nsg  stretcher  -     Row Name 07/20/22 1400          Therapy Assessment/Plan (PT)    Rehab Potential (PT) good, to achieve stated therapy goals  -     Criteria for Skilled Interventions Met (PT) yes  -     Therapy Frequency (PT) daily  -Count includes the [facility] Name 07/20/22 1400          PT Evaluation Complexity    History, PT Evaluation Complexity 1-2 personal factors and/or comorbidities  -     Examination of Body Systems (PT Eval Complexity) 1-2 elements  -     Clinical Presentation (PT Evaluation Complexity) evolving  -     Clinical Decision Making (PT Evaluation Complexity) moderate complexity  -     Overall Complexity (PT Evaluation Complexity) low complexity  -     Row Name 07/20/22 1400          Physical Therapy Goals    Gait Training Goal Selection (PT) gait training, PT goal 1  -     Stairs Goal Selection (PT) stairs, PT goal 1  -Count includes the [facility] Name 07/20/22 1400          Gait Training Goal 1 (PT)    Activity/Assistive Device (Gait Training Goal 1, PT) gait (walking locomotion);walker, rolling  -     [place] Level (Gait Training Goal 1, PT) modified independence  -     Distance (Gait Training Goal 1, PT) 80  -     Time Frame (Gait Training Goal 1, PT) 1 week  -Count includes the [facility] Name 07/20/22 1400          Stairs Goal 1 (PT)    Activity/Assistive Device (Stairs Goal 1, PT) descending stairs;ascending stairs  -     [place] Level/Cues Needed (Stairs Goal 1, PT) modified independence  -     Number of Stairs  (Stairs Goal 1, PT) 4  -     Time Frame (Stairs Goal 1, PT) 1 week  -           User Key  (r) = Recorded By, (t) = Taken By, (c) = Cosigned By    Initials Name Provider Type     Rebecca Russo, PT Physical Therapist    Elizabeth Canela, RN Registered Nurse                Physical Therapy Education                 Title: PT OT SLP Therapies (In Progress)     Topic: Physical Therapy (In Progress)     Point: Mobility training (In Progress)     Learning Progress Summary           Patient Acceptance, E, NR by  at 7/20/2022 1416                   Point: Home exercise program (In Progress)     Learning Progress Summary           Patient Acceptance, E, NR by  at 7/20/2022 1416                   Point: Body mechanics (In Progress)     Learning Progress Summary           Patient Acceptance, E, NR by  at 7/20/2022 1416                   Point: Precautions (In Progress)     Learning Progress Summary           Patient Acceptance, E, NR by  at 7/20/2022 1416                               User Key     Initials Effective Dates Name Provider Type Discipline     06/16/21 -  Rebecca Russo, PT Physical Therapist PT              PT Recommendation and Plan  Anticipated Discharge Disposition (PT): home with home health  Planned Therapy Interventions (PT): balance training, bed mobility training, gait training, home exercise program, ROM (range of motion), stair training, strengthening, stretching, transfer training  Therapy Frequency (PT): daily  Plan of Care Reviewed With: patient  Outcome Evaluation: Pt 66 yo male POD 0 R ant KATIE s/p spinal, pt w noted minimal quad activation limiting safe mobilization during exam. Pt stood bedside stretcher Min A rwx- PT guarding/blocking RLE to prevent buckling. Pt WBAT however performing minimal WB RLE due to quad weakness, unable to safely ambulate. Pt completed KATIE therex w assist for performance, PROM LAQ again due to quad weakness. Pt educated on anterior THP. Pt may benefit from  skilled PT acutely to address deficits, rwx delivered. RN bedside and observed PT session.   Outcome Measures     Row Name 07/20/22 1400             How much help from another person do you currently need...    Turning from your back to your side while in flat bed without using bedrails? 3  -LH      Moving from lying on back to sitting on the side of a flat bed without bedrails? 3  -LH      Moving to and from a bed to a chair (including a wheelchair)? 2  -LH      Standing up from a chair using your arms (e.g., wheelchair, bedside chair)? 2  -LH      Climbing 3-5 steps with a railing? 1  -LH      To walk in hospital room? 1  -      AM-PAC 6 Clicks Score (PT) 12  -              Functional Assessment    Outcome Measure Options AM-PAC 6 Clicks Basic Mobility (PT)  -            User Key  (r) = Recorded By, (t) = Taken By, (c) = Cosigned By    Initials Name Provider Type     Rebecca Russo, PT Physical Therapist                 Time Calculation:    PT Charges     Row Name 07/20/22 1418 07/20/22 1417          Time Calculation    Start Time 1150  - 1345  -     Stop Time 1200  - 1408  -     Time Calculation (min) 10 min  - 23 min  -     PT Received On -- 07/20/22  -     PT - Next Appointment -- 07/21/22  -     PT Goal Re-Cert Due Date -- 07/27/22  -            Time Calculation- PT    Total Timed Code Minutes- PT -- 15 minute(s)  -           User Key  (r) = Recorded By, (t) = Taken By, (c) = Cosigned By    Initials Name Provider Type     Rebecca Russo, PT Physical Therapist              Therapy Charges for Today     Code Description Service Date Service Provider Modifiers Qty    99082753785 HC PT EVAL LOW COMPLEXITY 2 7/20/2022 Rebecca Russo, PT GP 1    55365137879 HC PT THER PROC EA 15 MIN 7/20/2022 Rebecca Russo, PT GP 1    19551812492 HC PT THERAPEUTIC ACT EA 15 MIN 7/20/2022 Rebecca Russo, PT GP 1          PT G-Codes  Outcome Measure Options: AM-PAC 6 Clicks Basic Mobility (PT)  AM-PAC 6  Clicks Score (PT): 12    Rebecca Russo, PT  7/20/2022

## 2022-07-20 NOTE — THERAPY TREATMENT NOTE
Patient Name: Reji Max Sr.  : 1955    MRN: [id number]                              Today's Date: 2022       Admit Date: 2022    Visit Dx:     ICD-10-CM ICD-9-CM   1. History of total right hip replacement  Z96.641 V43.64     Patient Active Problem List   Diagnosis   • History of total right hip replacement     Past Medical History:   Diagnosis Date   • BPH (benign prostatic hyperplasia)    • DDD (degenerative disc disease), lumbar    • Diabetes mellitus (HCC)    • Elevated cholesterol    • GERD (gastroesophageal reflux disease)    • History of viral meningitis    • Hypertension    • Kidney stone     MICROSCOPIC HEMATURIA   • Myocardial infarction (HCC)     X 2   • Osteoarthritis of right hip      Past Surgical History:   Procedure Laterality Date   • CARDIAC CATHETERIZATION      MULTIPLE   • CORONARY ANGIOPLASTY WITH STENT PLACEMENT     • MINIMALLY INVASIVE MICRODISCECTOMY LUMBAR SPINE     • TONSILLECTOMY     • TOTAL KNEE ARTHROPLASTY Left    • TURP / TRANSURETHRAL INCISION / DRAINAGE PROSTATE        General Information     Row Name 22 1721          Physical Therapy Time and Intention    Document Type therapy note (daily note)  -CF     Mode of Treatment physical therapy;individual therapy  -CF     Row Name 22 172          General Information    Patient Profile Reviewed yes  -CF     Existing Precautions/Restrictions hip, anterior;right;fall  -CF     Row Name 22 1721          Cognition    Orientation Status (Cognition) oriented x 4  -CF           User Key  (r) = Recorded By, (t) = Taken By, (c) = Cosigned By    Initials Name Provider Type    CF Darlyn Claros PT Physical Therapist               Mobility     Row Name 22 1721          Bed Mobility    Bed Mobility supine-sit;sit-supine  -CF     Supine-Sit [place] (Bed Mobility) minimum assist (75% patient effort)  -CF     Sit-Supine [place] (Bed Mobility) minimum assist (75% patient effort)  -CF     Comment,  (Bed Mobility) at rle  -     Row Name 07/20/22 1721          Sit-Stand Transfer    Sit-Stand [place] (Transfers) minimum assist (75% patient effort);verbal cues;nonverbal cues (demo/gesture)  -     Assistive Device (Sit-Stand Transfers) walker, front-wheeled  -CF     Comment, (Sit-Stand Transfer) R knee blocked, [place] with any WS to R LE  -     Row Name 07/20/22 1721          Mobility    Extremity Weight-bearing Status right lower extremity  -CF     Right Lower Extremity (Weight-bearing Status) weight-bearing as tolerated (WBAT)  -           User Key  (r) = Recorded By, (t) = Taken By, (c) = Cosigned By    Initials Name Provider Type     Darlyn Claros PT Physical Therapist               Obj/Interventions     Row Name 07/20/22 1721          Motor Skills    Therapeutic Exercise other (see comments)  encouraged ankle pumps  -           User Key  (r) = Recorded By, (t) = Taken By, (c) = Cosigned By    Initials Name Provider Type     Darlyn Claros PT Physical Therapist               Goals/Plan    No documentation.                Clinical Impression     Row Name 07/20/22 1722          Pain    Pretreatment Pain Rating 0/10 - no pain  -     Posttreatment Pain Rating 0/10 - no pain  -CF     Row Name 07/20/22 1722          Plan of Care Review    Plan of Care Reviewed With patient  -CF     Outcome Evaluation Pt seen again today to attempt transfers and ambulation. Pt continues to have no quad activation s/p spinal block. Pt unable to perform quad set or SLR and unable to bear any weight onto RLE without knee buckling. PT blocking R knee for safety in standing. Remains unsafe to ambulate at this time. PT recommending followup with PT tomorrow before d/c home with assist and HH.  -     Row Name 07/20/22 1722          Vital Signs    O2 Delivery Pre Treatment room air  -     Row Name 07/20/22 1722          Positioning and Restraints    Pre-Treatment Position in bed  -CF     Post Treatment  Position bed  -CF     In Bed notified nsg;call light within reach;encouraged to call for assist;fowlers;with family/caregiver  -CF           User Key  (r) = Recorded By, (t) = Taken By, (c) = Cosigned By    Initials Name Provider Type    CF Darlyn Claros, PT Physical Therapist               Outcome Measures     Row Name 07/20/22 1724 07/20/22 1400       How much help from another person do you currently need...    Turning from your back to your side while in flat bed without using bedrails? 3  -CF 3  -LH    Moving from lying on back to sitting on the side of a flat bed without bedrails? 3  -CF 3  -LH    Moving to and from a bed to a chair (including a wheelchair)? 2  -CF 2  -LH    Standing up from a chair using your arms (e.g., wheelchair, bedside chair)? 2  -CF 2  -LH    Climbing 3-5 steps with a railing? 1  -CF 1  -LH    To walk in hospital room? 1  -CF 1  -LH    AM-PAC 6 Clicks Score (PT) 12  -CF 12  -LH    Highest level of mobility 4 --> Transferred to chair/commode  -CF 4 --> Transferred to chair/commode  -LH    Row Name 07/20/22 1724 07/20/22 1400       Functional Assessment    Outcome Measure Options AM-PAC 6 Clicks Basic Mobility (PT)  -CF AM-PAC 6 Clicks Basic Mobility (PT)  -          User Key  (r) = Recorded By, (t) = Taken By, (c) = Cosigned By    Initials Name Provider Type     Rebecca Russo, PT Physical Therapist    Darlyn Bridges, PAULY Physical Therapist                             Physical Therapy Education                 Title: PT OT SLP Therapies (Done)     Topic: Physical Therapy (Done)     Point: Mobility training (Done)     Learning Progress Summary           Patient Acceptance, E, VU,NR by  at 7/20/2022 1724    Acceptance, E, NR by  at 7/20/2022 1416                   Point: Home exercise program (Done)     Learning Progress Summary           Patient Acceptance, E, VU,NR by  at 7/20/2022 1724    Acceptance, E, NR by  at 7/20/2022 1416                   Point: Body mechanics  (Done)     Learning Progress Summary           Patient Acceptance, E, VU,NR by  at 7/20/2022 1724    Acceptance, E, NR by  at 7/20/2022 1416                   Point: Precautions (Done)     Learning Progress Summary           Patient Acceptance, E, VU,NR by  at 7/20/2022 1724    Acceptance, E, NR by  at 7/20/2022 1416                               User Key     Initials Effective Dates Name Provider Type Discipline     06/16/21 -  Rebecca Russo, PT Physical Therapist PT     06/16/21 -  Darlyn Claros PT Physical Therapist PT              PT Recommendation and Plan     Plan of Care Reviewed With: patient  Outcome Evaluation: Pt seen again today to attempt transfers and ambulation. Pt continues to have no quad activation s/p spinal block. Pt unable to perform quad set or SLR and unable to bear any weight onto RLE without knee buckling. PT blocking R knee for safety in standing. Remains unsafe to ambulate at this time. PT recommending followup with PT tomorrow before d/c home with assist and HH.     Time Calculation:    PT Charges     Row Name 07/20/22 1720 07/20/22 1418 07/20/22 1417       Time Calculation    Start Time 1702  - 1150  - 1345  -    Stop Time 1720  - 1200  - 1408  -    Time Calculation (min) 18 min  - 10 min  - 23 min  -    PT Received On 07/20/22  - -- 07/20/22  [facility]    PT - Next Appointment 07/21/22  - -- 07/21/22  -    PT Goal Re-Cert Due Date -- -- 07/27/22  -       Time Calculation- PT    Total Timed Code Minutes- PT -- -- 15 minute(s)  -          User Key  (r) = Recorded By, (t) = Taken By, (c) = Cosigned By    Initials Name Provider Type     Rebecca Russo, PT Physical Therapist     Darlyn Claros, PAULY Physical Therapist              Therapy Charges for Today     Code Description Service Date Service Provider Modifiers Qty    [id number]  PT THERAPEUTIC ACT EA 15 MIN 7/20/2022 Darlyn Claros, PT GP 1          PT G-Codes  Outcome Measure Options:  AM-PAC 6 Clicks Basic Mobility (PT)  -[facility] 6 Clicks Score (PT): 12    Darlyn Claros, PT  7/20/2022

## 2022-07-20 NOTE — PLAN OF CARE
Goal Outcome Evaluation:  Plan of Care Reviewed With: patient           Outcome Evaluation: Pt 68 yo male POD 0 R ant KATIE s/p spinal, pt w noted minimal quad activation limiting safe mobilization during exam. Pt stood bedside stretcher Min A rwx- PT guarding/blocking RLE to prevent buckling. Pt WBAT however performing minimal WB RLE due to quad weakness, unable to safely ambulate. Pt completed KATIE therex w assist for performance, PROM LAQ again due to quad weakness. Pt educated on anterior THP. Pt may benefit from skilled PT acutely to address deficits, rwx delivered. RN bedside and observed PT session.    .Patient was wearing a face mask during this therapy encounter. Therapist used appropriate personal protective equipment including eye protection, mask, and gloves.  Mask used was standard procedure mask. Appropriate PPE was worn during the entire therapy session. Hand hygiene was completed before and after therapy session. Patient is not in enhanced droplet precautions.

## 2022-07-20 NOTE — OP NOTE
Right TOTAL HIP ARTHROPLASTY ANTERIOR WITH HANA TABLE  Procedure Note    Reji Max Sr.  7/20/2022    Pre-op Diagnosis: Right hip osteoarthritis.   Post-op Diagnosis: Same  Procedure:  Right total hip arthroplasty, anterior approach  Surgeon:  Pasquale Buenrostro MD  Assistant: Mitchell Slade PA-C  The services of a first assist were necessary for performing the procedure safely and expeditiously.  The first assist was present for the entire duration of the case and helped with positioning, retraction and closure of the incision.    Anesthesia: Spinal, Anesthesiologist: Luis Felipe Sagastume MD  CRNA: Ines Julian CRNA  Staff: Circulator: Lilia Brownlee RN; Elizabeth Weathers RN  Scrub Person: Gavin Corea CFA  Estimated Blood Loss: 200ml  Specimens:   Order Name Source Comment Collection Info Order Time   HEMOGLOBIN AND HEMATOCRIT, BLOOD    7/20/2022  6:53 AM     Release to patient   Immediate          Drains: none  Complications: None    Components Utilized:    Implant Name Type Inv. Item Serial No.  Lot No. LRB No. Used Action   DEV CONTRL TISS STRATAFIX SPIRAL MNCRYL UD 3/0 PLS 30CM - [id number] Implant DEV CONTRL TISS STRATAFIX SPIRAL MNCRYL UD 3/0 PLS 30CM  [employer] . Right 1 Implanted   DEV CONTRL TISS STRATAFIX SYMM PDS PLUS ERASTO CT-1 45CM - [id number] Implant DEV CONTRL TISS STRATAFIX SYMM PDS PLUS ERASTO CT-1 45CM  [employer]  [employer] . Right 1 Implanted   SHLL ACET OSSEOTI G7 4H SZF 56MM - [id number] Implant SHLL ACET OSSEOTI G7 4H SZF 56MM  [employer] [id number] Right 1 Implanted   LINER ACET G7 VIVACIT/E NTRL HXPE SZF 36MM - [id number] Implant LINER ACET G7 VIVACIT/E NTRL HXPE SZF 36MM  [employer] [id number] Right 1 Implanted   STEM FEM HIP TAPERLOC COMPL MICROPLASTY HI OFFST SZ11 - [id number] Implant STEM FEM HIP TAPERLOC COMPL MICROPLASTY HI OFFST SZ11  [employer] [id number] Right 1 Implanted   HD MOD FEM/HIP G7 BIOLOX/DELTA TYP1 CERAM  36MM MIN3 - [id number] Implant HD MOD FEM/HIP G7 BIOLOX/DELTA TYP1 CERAM 36MM MIN3  [employer] [id number] Right 1 Implanted       Indication for Procedure:  This patient is a 67 y.o. male who has failed conservative treatment for management of arthritis of the operative hip.  Surgical options and non-surgical options were discussed in detail and to the patient's satisfaction.  Surgical intervention was recommended based on the patient's injury and functional status.      The risks and benefits of surgery were discussed with patient and informed consent was obtained.  Risks include but are not limited to, infection, bleeding, nerve injury, blood clots, risks associated with anesthesia, need for further surgery, persistent pain, and possibly death. The risk of leg length discrepancy was also discussed with the patient.    Protocols for intravenous antibiotics and venous thrombosis were followed for this patient.  IV antibiotics were infused prior to surgery and will be discontinued within 24 hours of completion of the surgical procedure.         DESCRIPTION OF PROCEDURE:     The patient was seen in preoperative area where their surgical site was marked. Preoperative antibiotics were received. H&P and consent updated. They were taken to the Operating Room and provided the aforementioned anesthetic on the hospital bed.  The patient was then transferred to the hand operating table in the supine position.  Traction boots were applied.  The perineal post was placed.  All bony prominences were well-padded.  At this point the extremity is prepped and draped in usual sterile fashion using alcohol followed by ChloraPrep.  Next a formal surgical timeout was carried out.  All members of the team were in agreement.  At this point the anterior superior iliac spine was marked out and an incision extending from about to 2 cm lateral and 1 cm distal to the anterior superior iliac spine was extended in the longitudinal fashion through  the skin and subcutaneous tissue with a 10 blade scalpel.  The fascia overlying the tensor fascia hallie muscle belly was identified and sharply incised.  The tensor fascia was reflected laterally and an Yue-Alexander retractor was placed.  Next the myofascia of the rectus femoris was incised using the Bovie and the rectus was reflected medially.  The reflected head of the rectus was partially released.  Next the floor of the rectus femoris fascia was incised the ascending branch lateral femoral circumflex vessels were identified and cauterized using the bipolar cautery device.  Next the pericapsular fat pad was incised and the iliocapsularis was reflected medially.  The superior and inferior extracapsular retractors were placed.  Next Bovie electrocautery was used to incise the capsule making an L-shaped flap.  The retractors were placed intracapsularly.  The intertrochanteric tubercle was identified and helped to identify the level of the femoral neck osteotomy compared with preoperative templating.  Next a sagittal saw was used to make the osteotomy in the femoral neck.  The extremity was placed on traction and gentle external rotation. The corkscrew was used to remove the femoral head.  The femoral head was measured on the back table.  Next the traction was taken off the pubofemoral ligament was identified and released off the proximal femur down to the level of the lesser trochanter. The femur was then rotated externally to 90° and the remnant of the pubofemoral  ligament was released.  Next the mini Charnley retractor was placed intracapsularly to expose the acetabulum.  10 blade scalpel was used to remove the labral remnant.  The Bovie electrocautery was used to remove the ligament of teres and pulvinar.  Next C-arm imaging was used to make an AP pelvis as well as AP hip.  The acetabulum was sequentially reamed up to a 1 size smaller than the definitive implant.  The acetabulum was exceptionally sclerotic.  I  ended up reaming down to a size 52 mm reamer and used sharp 52 and 54 mm reamers to begin medializing.  Even with this, the bone was extremely sclerotic and actually started smoking when reaming.  I eventually used a curette to start to denude some of the sclerotic bone.  I then went back to the reamers and was able to finally reaming medialize in appropriate fashion.  The implant was impacted receiving excellent purchase.  No screws were utilized given the excellent purchase..  A definitive polyethylene liner was placed.  The acetabular component did have coverage anteriorly.  Overhanging inferior osteophytes were removed.  Final imaging of the acetabular component was assessed and determined to be appropriate inclination as well as version.  Next, attention was directed towards the femur.  The femoral lift was placed lateral to the vastus lateralis.  The femur was placed in 90° of external rotation.  A longitudinal incision was made in the posterior capsule and the ischiofemoral ligament.  This was incised to the level of the fat pad between the capsule and the gluteus medius and minimus.  A trochanteric retractor was placed here.  Next further release was performed reflecting the piriformis posteriorly over the trochanter.  The obturator externus was maintained as was the piriformis.  The conjoined tendon was reflected over the greater trochanter, maintaining its insertion.  The femur was delivered through the capsular rent and dropped to the floor. The femur was placed in external rotation.  The box osteotome was used to open the femur followed by a rasp.  The femur was then sequentially broached up to the size of the definitive implant,  achieving excellent fixation.  A trial neck and head was placed in accordance with preoperative templating and intraoperative imaging.  The hip was reduced and had excellent stability at 90° with lateral traction with the bone.  C-arm images were again obtained and interischial  line technique using a metal drop vaughn was utilized to compare the operative extremity to the contralateral side in regards to leg length as well as offset.  The hip was dislocated again and definitive components were seleted and impacted into placed.  Of note, after impacting the femoral stem it was noted that the tip of the  had snapped off into the lateral shoulder of the stem.  I used some small Shelby osteotomes to remove the remaining metallic fragment of the  which did take about an additional 20 minutes.  I was able to remove all residual fractured instrument from the femoral stem.  There was no evidence of intraoperative fracture.  The hip was again imaged to confirm stem position. This remained stable with 90 degrees of external rotation and lateral traction with the bone hook.  Next the wound was irrigated copiously sterile saline via pulsatile saline lavage with 0.35% betadine.  Periarticular injection was placed in the iliocapsularis as well as rectus femoris and tensor fascia hallie muscle bellies.  The capsule was not closed however was laid back over the anterior aspect of the neck.  Next the fascia overlying the tensor fascia hallie was closed using 0 Vicryl suture in running fashion.  Careful attention was paid towards avoiding the lateral femoral cutaneous nerve during the closure.  Subcutaneous layer was closed in 2-0 Vicryl suture interrupted inverted fashion followed by 3-0 Monocryl for the subcuticular layer.  Dermabond and a sterile occlusive dressing were applied.  All counts were correct at the end of the procedure.  The patient was awakened in satisfactory condition and transferred to the postoperative care unit.    Postoperative Plan:     The patient will receive  Aspirin for DVT prophylaxis for 30 days post operatively  The patient will be weight bearing as tolerated with no hip precautions.    Plan will be for discharge to home with home health care today     Pasquale Buenrostro MD

## 2022-07-20 NOTE — CASE MANAGEMENT/SOCIAL WORK
Continued Stay Note  [facility]     Patient Name: Reji Max Sr.  MRN: [id number]  Today's Date: 7/20/2022    Admit Date: 7/20/2022     Discharge Plan     Row Name 07/20/22 1233       Plan    Plan Home with [facility]               Discharge Codes    No documentation.               Expected Discharge Date and Time     Expected Discharge Date Expected Discharge Time    Jul 20, 2022             Shannon Epley, RN

## 2022-07-20 NOTE — NURSING NOTE
Pt reports taking last dose of aspirin yesterday morning @0900. States he forgot he was told to hold it for 5 days prior to surgery. Dr. Buenrostro informed. MD states ok to proceed with scheduled procedure as planned.

## 2022-07-20 NOTE — ANESTHESIA PROCEDURE NOTES
Spinal Block      Patient reassessed immediately prior to procedure    Patient location during procedure: OR  Start Time: 7/20/2022 7:18 AM  Stop Time: 7/20/2022 7:29 AM  Indication:at surgeon's request  Performed By  Anesthesiologist: Luis Felipe Sagastume MD  Preanesthetic Checklist  Completed: patient identified, IV checked, site marked, risks and benefits discussed, surgical consent, monitors and equipment checked, pre-op evaluation and timeout performed  Spinal Block Prep:  Patient Position:sitting  Sterile Tech:cap, gloves, mask and sterile barriers  Prep:Chloraprep  Patient Monitoring:blood pressure monitoring and continuous pulse oximetry  Spinal Block Procedure  Approach:midline  Guidance:landmark technique  Location:L3-L4  Needle Type:Pam  Needle Gauge:25 G  Placement of Spinal needle event:cerebrospinal fluid aspirated  Paresthesia: no  Fluid Appearance:clear  Medications: bupivacaine PF (MARCAINE) 0.75 % injection, 1.4 mL  Med Administered at 7/20/2022 7:29 AM   Post Assessment  Patient Tolerance:patient tolerated the procedure well with no apparent complications  Complications no

## 2022-07-21 VITALS
DIASTOLIC BLOOD PRESSURE: 70 MMHG | HEART RATE: 69 BPM | RESPIRATION RATE: 16 BRPM | TEMPERATURE: 97.2 F | OXYGEN SATURATION: 96 % | SYSTOLIC BLOOD PRESSURE: 141 MMHG

## 2022-07-21 LAB
GLUCOSE BLDC GLUCOMTR-MCNC: 180 MG/DL (ref 70–130)
HCT VFR BLD AUTO: 31.3 % (ref 37.5–51)
HGB BLD-MCNC: 9.7 G/DL (ref 13–17.7)

## 2022-07-21 PROCEDURE — 63710000001 GLIPIZIDE 5 MG TABLET: Performed by: ORTHOPAEDIC SURGERY

## 2022-07-21 PROCEDURE — G0378 HOSPITAL OBSERVATION PER HR: HCPCS

## 2022-07-21 PROCEDURE — 97116 GAIT TRAINING THERAPY: CPT

## 2022-07-21 PROCEDURE — A9270 NON-COVERED ITEM OR SERVICE: HCPCS | Performed by: ORTHOPAEDIC SURGERY

## 2022-07-21 PROCEDURE — 85014 HEMATOCRIT: CPT | Performed by: ORTHOPAEDIC SURGERY

## 2022-07-21 PROCEDURE — 63710000001 INSULIN LISPRO (HUMAN) PER 5 UNITS: Performed by: ORTHOPAEDIC SURGERY

## 2022-07-21 PROCEDURE — 25010000002 CEFAZOLIN PER 500 MG: Performed by: ORTHOPAEDIC SURGERY

## 2022-07-21 PROCEDURE — 63710000001 METOPROLOL SUCCINATE XL 50 MG TABLET SUSTAINED-RELEASE 24 HOUR: Performed by: ORTHOPAEDIC SURGERY

## 2022-07-21 PROCEDURE — 63710000001 ASPIRIN 81 MG TABLET DELAYED-RELEASE: Performed by: ORTHOPAEDIC SURGERY

## 2022-07-21 PROCEDURE — 63710000001 HYDROCODONE-ACETAMINOPHEN 7.5-325 MG TABLET: Performed by: ORTHOPAEDIC SURGERY

## 2022-07-21 PROCEDURE — 82962 GLUCOSE BLOOD TEST: CPT

## 2022-07-21 PROCEDURE — 85018 HEMOGLOBIN: CPT | Performed by: ORTHOPAEDIC SURGERY

## 2022-07-21 PROCEDURE — 97110 THERAPEUTIC EXERCISES: CPT

## 2022-07-21 RX ORDER — GLIPIZIDE 5 MG/1
5 TABLET ORAL
Status: DISCONTINUED | OUTPATIENT
Start: 2022-07-21 | End: 2022-07-21 | Stop reason: HOSPADM

## 2022-07-21 RX ORDER — PANTOPRAZOLE SODIUM 40 MG/1
40 TABLET, DELAYED RELEASE ORAL EVERY MORNING
Status: DISCONTINUED | OUTPATIENT
Start: 2022-07-21 | End: 2022-07-21 | Stop reason: HOSPADM

## 2022-07-21 RX ORDER — METOPROLOL SUCCINATE 50 MG/1
50 TABLET, EXTENDED RELEASE ORAL DAILY
Status: DISCONTINUED | OUTPATIENT
Start: 2022-07-21 | End: 2022-07-21 | Stop reason: HOSPADM

## 2022-07-21 RX ORDER — ROSUVASTATIN CALCIUM 40 MG/1
40 TABLET, COATED ORAL NIGHTLY
Status: DISCONTINUED | OUTPATIENT
Start: 2022-07-21 | End: 2022-07-21 | Stop reason: HOSPADM

## 2022-07-21 RX ADMIN — INSULIN LISPRO 2 UNITS: 100 INJECTION, SOLUTION INTRAVENOUS; SUBCUTANEOUS at 08:48

## 2022-07-21 RX ADMIN — HYDROCODONE BITARTRATE AND ACETAMINOPHEN 2 TABLET: 7.5; 325 TABLET ORAL at 11:51

## 2022-07-21 RX ADMIN — HYDROCODONE BITARTRATE AND ACETAMINOPHEN 2 TABLET: 7.5; 325 TABLET ORAL at 08:49

## 2022-07-21 RX ADMIN — CEFAZOLIN 2 G: 10 INJECTION, POWDER, FOR SOLUTION INTRAVENOUS at 05:48

## 2022-07-21 RX ADMIN — METOPROLOL SUCCINATE 50 MG: 50 TABLET, EXTENDED RELEASE ORAL at 08:48

## 2022-07-21 RX ADMIN — ASPIRIN 81 MG: 81 TABLET, COATED ORAL at 08:48

## 2022-07-21 RX ADMIN — HYDROCODONE BITARTRATE AND ACETAMINOPHEN 2 TABLET: 7.5; 325 TABLET ORAL at 02:53

## 2022-07-21 RX ADMIN — GLIPIZIDE 5 MG: 5 TABLET ORAL at 08:48

## 2022-07-21 NOTE — PROGRESS NOTES
Subjective :   Patient seen and examined.  Resting comfortably in his hospital bed.  No acute issues overnight.  Pain appropriately controlled.  Patient attempted to walk yesterday with physical therapy, but was unable to ambulate safely s/p spinal block. Pt will follow-up again today.    Objective :    Vital signs in last 24 hours:  Vitals:    07/20/22 1102 07/20/22 1928 07/20/22 2249 07/21/22 0248   BP: 133/75 135/64 142/71 138/71   BP Location: Left arm Left arm Left arm Left arm   Patient Position: Lying Lying Lying Lying   Pulse: 56 70 75 83   Resp: 18 18 18 18   Temp:  97.8 °F (36.6 °C) 98.9 °F (37.2 °C) 98.4 °F (36.9 °C)   TempSrc:  Oral Oral Oral   SpO2: 98% 97% 96% 94%       PHYSICAL EXAM:  Patient is calm, in no acute distress, awake and oriented x 3.  Dressing is clean, dry and intact.  No signs of infection.  Swelling is appropriate in amount.  Ecchymosis is appropriate in amount.  Homans test is negative.  Patient is neurovascularly intact distally.  EHL, FHL, GS, TA intact.  DP/TP 2+.    LABS:  Results from last 7 days   Lab Units 07/21/22  0352   HEMOGLOBIN g/dL 9.7*   HEMATOCRIT % 31.3*            Study Result    Narrative & Impression   TWO-VIEW RIGHT HIP AND ONE VIEW AP PELVIS     HISTORY: Hip replacement for osteoarthritis     FINDINGS: The patient has had recent total hip replacement on the right  and the alignment appears satisfactory.     This report was finalized on 7/20/2022 10:27 AM by Dr. Vladimir Dalal M.D.          ASSESSMENT:  Status post right total hip arthroplasty, POD #1    Plan:  Continue Physical Therapy, increase mobility and range of motion as tolerated.  Continue SCDs, Continue DVT prophylaxis.  Aspirin 81 mg BID after discharge.  Dispo planning for home with home health when medically stable and meeting physical therapy goals likely today.    Mitchell Remy III, PATommieC    Date: 7/21/2022  Time: 07:06 EDT

## 2022-07-21 NOTE — PLAN OF CARE
Goal Outcome Evaluation:  Plan of Care Reviewed With: patient        Progress: improving  Outcome Evaluation: Pt tolerated treatment well this date. Much improved today, w/ good quad control and no knee buckling throughout session. Increased gait distance to 200ft w/ Rw and CGA-SBA. Completed stairs w/ use of hand rails and CGA for safety. Pt also completed R hip exercises w/ no concerns. Safe to d/c home w/ HH from PT standpoint.

## 2022-07-21 NOTE — PLAN OF CARE
Goal Outcome Evaluation:  Plan of Care Reviewed With: patient        Progress: improving  Outcome Evaluation: POD #1 for right ant. hip, discharge held due to buckling right leg s/p spinal. VSS, po pain meds effective, voiding, border dressing c/d/i. anticipating discharge today.

## 2022-07-21 NOTE — CASE MANAGEMENT/SOCIAL WORK
Discharge Planning Assessment  [facility]     Patient Name: Reji Max Sr.  MRN: [id number]  Today's Date: 7/21/2022    Admit Date: 7/20/2022     Discharge Needs Assessment    No documentation.                Discharge Plan     Row Name 07/21/22 1003       Plan    Plan Home with family support & [facility].    Patient/Family in Agreement with Plan yes    Plan Comments Spoke with the patient, verified current information and explained the role of the CCP. Patient said he lives with his wife/Radha and has family support. He plans to d/c home with family support & HH. Careplan received from Dr. Buenrostro's Office which plans on [facility]. Discussed with the patient who's agreeable. Referral sent/accepted in [facility]. Patient plans for his family to transport him home at d/c. No other needs identified. CCP will follow.              Continued Care and Services - Admitted Since 7/20/2022     Home Medical Care Coordination complete.    Service Provider Request Status Selected Services Address Phone Fax Patient Preferred    [facility]   Selected Home Health Services [address] [place] [id number] [phone] --              Expected Discharge Date and Time     Expected Discharge Date Expected Discharge Time    Jul 21, 2022           Jeanine CORDERO, RN

## 2022-07-21 NOTE — DISCHARGE PLACEMENT REQUEST
"Reji Max Sr. (67 y.o. Male)             Date of Birth   1955    Social Security Number       Address   [address] [id number]    Home Phone   [phone]    MRN   [id number]       Baptism   Worship    Marital Status                               Admission Date   7/20/22    Admission Type   Elective    Admitting Provider   Pasquale Buenrostro MD    Attending Provider   Pasquale Buenrostro MD    Department, Room/Bed   [address], P899/1       Discharge Date       Discharge Disposition   Home-Health Care [facility] [place] [facility]    Discharge Destination                               Attending Provider: Pasquale Buenrostro MD    Allergies: Erythromycin, Keflex [Cephalexin]    Isolation: None   Infection: None   Code Status: Not on file   Advance Care Planning Activity    Ht: 172.7 cm (68\")   Wt: 94.6 kg (208 lb 9.6 oz)    Admission Cmt: None   Principal Problem: None                Active Insurance as of 7/20/2022     Primary Coverage     Payor Plan Insurance Group Employer/Plan Group    MEDICARE MEDICARE A & B      Payor Plan Address Payor Plan Phone Number Payor Plan Fax Number Effective Dates    [address] [phone]  4/1/2019 - None Entered    [facility] [id number]       Subscriber Name Subscriber Birth Date Member ID       REJI MAX SR. 1955 [id number]           Secondary Coverage     Payor Plan Insurance Group Employer/Plan Group    [facility] SUPP [id number]     Payor Plan Address Payor Plan Phone Number Payor Plan Fax Number Effective Dates    [address]   1/1/2020 - None Entered    [facility] [id number]       Subscriber Name Subscriber Birth Date Member ID       REJI MAX SR. 1955 [id number]                 Emergency Contacts      (Rel.) Home Phone Work Phone Mobile Phone    BRIGHT MAX (Spouse) -- -- [phone]              "

## 2022-07-21 NOTE — DISCHARGE SUMMARY
Discharge Summary    Date of Admission: 7/20/2022  5:15 AM    Date of Discharge:  7/21/2022    Discharge Diagnosis:   History of total right hip replacement [Z96.641]      PMHX:   Past Medical History:   Diagnosis Date   • BPH (benign prostatic hyperplasia)    • DDD (degenerative disc disease), lumbar    • Diabetes mellitus (HCC)    • Elevated cholesterol    • GERD (gastroesophageal reflux disease)    • History of viral meningitis    • Hypertension    • Kidney stone     MICROSCOPIC HEMATURIA   • Myocardial infarction (HCC)     X 2   • Osteoarthritis of right hip        Discharge Disposition  Home-Health Care [facility]    Procedures Performed  Procedure(s):  TOTAL HIP ARTHROPLASTY ANTERIOR WITH HANA TABLE       Indication for Admission  Patient is a 67 y.o. male admitted after undergoing the above surgical procedure. They were admitted for post-operative pain control, medical management and physical therapy.  They progressed with physical therapy.  The patient was not cleared to be discharged on postoperative day #0 secondary to delayed reversal spinal anesthetic.  They were deemed stable for discharge.      Consults:   Consults     No orders found for last 30 day(s).          Discharge Instructions:  Patient is weight bearing as tolerated on the operative leg.  Patient has no hip dislocation precautions.  Patient is to progress ambulation as tolerated.  Use walker as needed for stability and gait.  May progress to cane as tolerated.  The dressing is waterproof, and the patient may shower starting 3 days after the operation.  Keep dressing in place 7 days. May change dressing before saturated or starts to fall off.  Patient will follow-up in the office at 3 weeks. Home health physical therapy will follow patient once patient is discharged home.   Call the office at [phone] for any questions or concerns.      Discharge Medications     Discharge Medications      New Medications      Instructions Start Date    cefadroxil 500 MG capsule  Commonly known as: DURICEF   500 mg, Oral, 2 Times Daily      celecoxib 100 MG capsule  Commonly known as: CeleBREX   100 mg, Oral, 2 Times Daily      HYDROcodone-acetaminophen 7.5-325 MG per tablet  Commonly known as: Norco   1 tablet, Oral, Every 6 Hours PRN      ondansetron 4 MG tablet  Commonly known as: Zofran   4 mg, Oral, Every 8 Hours PRN      traMADol 50 MG tablet  Commonly known as: ULTRAM   50 mg, Oral, Every 8 Hours PRN         Changes to Medications      Instructions Start Date   Adult Aspirin Regimen 81 MG EC tablet  Generic drug: aspirin  What changed:   · when to take this  · additional instructions   81 mg, Oral, 2 Times Daily         Continue These Medications      Instructions Start Date   glimepiride 2 MG tablet  Commonly known as: AMARYL   2 mg, Oral, Every Morning Before Breakfast      lisinopril 30 MG tablet  Commonly known as: PRINIVIL,ZESTRIL   30 mg, Daily      metFORMIN 1000 MG tablet  Commonly known as: GLUCOPHAGE   1,000 mg, Oral, 2 Times Daily With Meals      metoprolol succinate XL 50 MG 24 hr tablet  Commonly known as: TOPROL-XL   50 mg, Oral, Daily      omeprazole 40 MG capsule  Commonly known as: priLOSEC   40 mg, Oral, Every Evening      rosuvastatin 40 MG tablet  Commonly known as: CRESTOR   40 mg, Oral, Nightly         Stop These Medications    Chlorhexidine Gluconate Cloth 2 % pads            Discharge Diet: Regular diet    Activity at Discharge: Weight bearing as tolerated,     Follow-up Appointments  No future appointments.          07/21/22,  07:36 ALEJANDROT    Pasquale Buenrostro MD  Orthopaedic Surgeon    [facility]  [phone]

## 2022-07-21 NOTE — THERAPY TREATMENT NOTE
Patient Name: Reji Max Sr.  : 1955    MRN: [id number]                              Today's Date: 2022       Admit Date: 2022    Visit Dx:     ICD-10-CM ICD-9-CM   1. History of total right hip replacement  Z96.641 V43.64     Patient Active Problem List   Diagnosis   • History of total right hip replacement     Past Medical History:   Diagnosis Date   • BPH (benign prostatic hyperplasia)    • DDD (degenerative disc disease), lumbar    • Diabetes mellitus (HCC)    • Elevated cholesterol    • GERD (gastroesophageal reflux disease)    • History of viral meningitis    • Hypertension    • Kidney stone     MICROSCOPIC HEMATURIA   • Myocardial infarction (HCC)     X 2   • Osteoarthritis of right hip      Past Surgical History:   Procedure Laterality Date   • CARDIAC CATHETERIZATION      MULTIPLE   • CORONARY ANGIOPLASTY WITH STENT PLACEMENT     • MINIMALLY INVASIVE MICRODISCECTOMY LUMBAR SPINE     • TONSILLECTOMY     • TOTAL HIP ARTHROPLASTY Right 2022    Procedure: TOTAL HIP ARTHROPLASTY ANTERIOR WITH HANA TABLE;  Surgeon: Pasquale Buenrostro MD;  Location: [facility] OR [facility] – [place], [place];  Service: Orthopedics;  Laterality: Right;   • TOTAL KNEE ARTHROPLASTY Left    • TURP / TRANSURETHRAL INCISION / DRAINAGE PROSTATE        General Information     Row Name 22 134          Physical Therapy Time and Intention    Document Type therapy note (daily note)  -     Mode of Treatment physical therapy  -     Row Name 22 134          General Information    Existing Precautions/Restrictions fall  -     Row Name 22 134          Cognition    Orientation Status (Cognition) oriented x 4  -           User Key  (r) = Recorded By, (t) = Taken By, (c) = Cosigned By    Initials Name Provider Type     Angely Garcia PTA Physical Therapist Assistant               Mobility     Row Name 22 1349          Bed Mobility    Comment, (Bed Mobility) up in chair  -     Row Name 22 1349           Sit-Stand Transfer    Sit-Stand [place] (Transfers) supervision  -     Assistive Device (Sit-Stand Transfers) walker, front-wheeled  -SM     Row Name 07/21/22 1347          Gait/Stairs (Locomotion)    [place] Level (Gait) standby assist  -     Assistive Device (Gait) walker, front-wheeled  -SM     Distance in Feet (Gait) 200  -SM     Deviations/Abnormal Patterns (Gait) antalgic;ashvin decreased;stride length decreased  -     Bilateral Gait Deviations forward flexed posture  -SM     Right Sided Gait Deviations weight shift ability decreased  -     [place] Level (Stairs) contact guard  -     Handrail Location (Stairs) both sides  -SM     Number of Steps (Stairs) 4  -SM     Ascending Technique (Stairs) step-to-step  -SM     Descending Technique (Stairs) step-to-step  -SM           User Key  (r) = Recorded By, (t) = Taken By, (c) = Cosigned By    Initials Name Provider Type    Angely Villeda PTA Physical Therapist Assistant               Obj/Interventions     Row Name 07/21/22 1348          Motor Skills    Therapeutic Exercise --  R THR protocol x10 reps  -           User Key  (r) = Recorded By, (t) = Taken By, (c) = Cosigned By    Initials Name Provider Type    Angely Villeda PTA Physical Therapist Assistant               Goals/Plan    No documentation.                Clinical Impression     Row Name 07/21/22 1349          Pain    Pretreatment Pain Rating 1/10  -     Posttreatment Pain Rating 3/10  -SM     Pain Location - Side/Orientation Right  -     Pain Location - hip  -     Pain Intervention(s) Repositioned;Ambulation/increased activity;Rest  -     Row Name 07/21/22 1343          Positioning and Restraints    Pre-Treatment Position sitting in chair/recliner  -     Post Treatment Position chair  -SM     In Chair reclined;call light within reach;encouraged to call for assist;notified nsg  -           User Key  (r) = Recorded By, (t) = Taken By, (c) = Cosigned  By    Initials Name Provider Type    Angely Villeda PTA Physical Therapist Assistant               Outcome Measures     Row Name 07/21/22 1349          How much help from another person do you currently need...    Turning from your back to your side while in flat bed without using bedrails? 4  -SM     Moving from lying on back to sitting on the side of a flat bed without bedrails? 4  -SM     Moving to and from a bed to a chair (including a wheelchair)? 4  -SM     Standing up from a chair using your arms (e.g., wheelchair, bedside chair)? 4  -SM     Climbing 3-5 steps with a railing? 3  -SM     To walk in hospital room? 3  -SM     AM-PAC 6 Clicks Score (PT) 22  -     Highest level of mobility 7 --> Walked 25 feet or more  -     Row Name 07/21/22 1349          Functional Assessment    Outcome Measure Options AM-PAC 6 Clicks Basic Mobility (PT)  -           User Key  (r) = Recorded By, (t) = Taken By, (c) = Cosigned By    Initials Name Provider Type    Angely Villeda PTA Physical Therapist Assistant                             Physical Therapy Education                 Title: PT OT SLP Therapies (Resolved)     Topic: Physical Therapy (Resolved)     Point: Mobility training (Resolved)     Learning Progress Summary           Patient Acceptance, E,TB,D, VU by  at 7/21/2022 1350    Acceptance, E, VU,NR by  at 7/20/2022 1724    Acceptance, E, NR by  at 7/20/2022 1416                   Point: Home exercise program (Resolved)     Learning Progress Summary           Patient Acceptance, E,TB,D, VU by  at 7/21/2022 1350    Acceptance, E, VU,NR by  at 7/20/2022 1724    Acceptance, E, NR by  at 7/20/2022 1416                   Point: Body mechanics (Resolved)     Learning Progress Summary           Patient Acceptance, E,TB,D, VU by  at 7/21/2022 1350    Acceptance, E, VU,NR by  at 7/20/2022 1724    Acceptance, E, NR by  at 7/20/2022 1416                   Point: Precautions (Resolved)      Learning Progress Summary           Patient Acceptance, E,TB,D, VU by  at 7/21/2022 1350    Acceptance, E, VU,NR by  at 7/20/2022 1724    Acceptance, E, NR by  at 7/20/2022 1416                               User Key     Initials Effective Dates Name Provider Type Discipline     06/16/21 -  Rebecca Russo, PT Physical Therapist PT     03/07/18 -  Angely Garcia PTA Physical Therapist Assistant PT     06/16/21 -  Darlyn Claros, PAULY Physical Therapist PT              PT Recommendation and Plan     Plan of Care Reviewed With: patient  Progress: improving  Outcome Evaluation: Pt tolerated treatment well this date. Much improved today, w/ good quad control and no knee buckling throughout session. Increased gait distance to 200ft w/ Rw and CGA-SBA. Completed stairs w/ use of hand rails and CGA for safety. Pt also completed R hip exercises w/ no concerns. Safe to d/c home w/ HH from PT standpoint.     Time Calculation:    PT Charges     Row Name 07/21/22 1352             Time Calculation    Start Time 0919  -      Stop Time 0942  -      Time Calculation (min) 23 min  -      PT Received On 07/21/22  -      PT - Next Appointment 07/22/22  -            User Key  (r) = Recorded By, (t) = Taken By, (c) = Cosigned By    Initials Name Provider Type     Angely Garcia PTA Physical Therapist Assistant              Therapy Charges for Today     Code Description Service Date Service Provider Modifiers Qty    46316923320 HC PT THER PROC EA 15 MIN 7/21/2022 Angely Garcai PTA GP 1    [id number] HC GAIT TRAINING EA 15 MIN 7/21/2022 Angely Garcia PTA GP 1          PT G-Codes  Outcome Measure Options: AM-PAC 6 Clicks Basic Mobility (PT)  AM-PAC 6 Clicks Score (PT): 22    Angely Garcia PTA  7/21/2022

## 2022-07-22 ENCOUNTER — TELEPHONE (OUTPATIENT)
Dept: ORTHOPEDIC SURGERY | Facility: HOSPITAL | Age: 67
End: 2022-07-22

## 2022-07-22 NOTE — TELEPHONE ENCOUNTER
Post op day 2  Discharge Instructions:  Ask patient about his or her discharge instructions  ?  Patient confirmed understanding   ?  Further instruction needed   What, if any, recommendations, teaching, or interventions did you provide? Click or tap here to enter text.  Health status:  Pain controlled Yes   Recommended interventions:  Yes  incision/dressing status   ?  Clean without redness, drainage, odor  ?  Redness    ?  Drainage - color Click or tap here to enter text.  ?  Odor  SHELDON - Green light blinking Choose an item.  Difficulties urination No  Last BM 7/19/2022 (if no BM by day 3-recommend OTC suppository or fleets enema)  No BM yet, he said there is a cocktail that he normally makes that does the trick and he would do it tonight if no BM today after PT.   Medications:  ?Medications reviewed with patient/family/caregiver  Patient taking medications as prescribed?   Yes  If not taking medications as prescribed, note specific medicine(s) and reason for each:  Click or tap here to enter text.  Hospital Follow Up Plan:  Follow up Appointment with Orthopedic surgeon:  ?Has f/u appointment                ?Scheduled f/u appointment  Home Care ordered at discharge?    Yes        Home Care started, or contact made?    Yes   If no, action taken: Click or tap here to enter text.  DME obtained/used in home?         Yes   Other information: Mr. Max was to be a SDD, he ended up having to stay because his block did not wear off enough for him to ambulate and pass PT. He said the block did wear off enough that we was able to D/C Thursday, and it was rough. Today is a little better but he still hurts. He is able to walk, no issues with urination. Pain medication makes the pain tolerable. He is HH PT today. Dressing looks good, Denies swelling and bruising. Mr. Max doesn’t have any questions/concerns for me at this time. He has my contact information should he need anything. He voiced understanding and appreciated  the call.

## 2022-07-22 NOTE — CASE MANAGEMENT/SOCIAL WORK
Case Management Discharge Note      Final Note: Home with [facility].         Selected Continued Care - Discharged on 7/21/2022 Admission date: 7/20/2022 - Discharge disposition: Home-Health Care c    Destination    No services have been selected for the patient.              Durable Medical Equipment    No services have been selected for the patient.              Dialysis/Infusion    No services have been selected for the patient.              Home Medical Care Coordination complete.    Service Provider Selected Services Address Phone Fax Patient Preferred    [facility]  [facility] [address] [place] [phone] --          Therapy    No services have been selected for the patient.              Community Resources    No services have been selected for the patient.              Community & DME    No services have been selected for the patient.                       Final Discharge Disposition Code: 01 - home or self-care

## (undated) DEVICE — DRSNG SURESITE123 8X12IN

## (undated) DEVICE — SUT SILK 2/0 TIES 18IN A185H

## (undated) DEVICE — NEEDLE, QUINCKE, 20GX3.5": Brand: MEDLINE

## (undated) DEVICE — ANTIBACTERIAL UNDYED BRAIDED (POLYGLACTIN 910), SYNTHETIC ABSORBABLE SUTURE: Brand: COATED VICRYL

## (undated) DEVICE — RECIPROCATING BLADE HEAVY DUTY LONG, OFFSET  (77.6 X 0.77 X 11.2MM)

## (undated) DEVICE — GLV SURG BIOGEL LTX PF 8

## (undated) DEVICE — SUT ETHIB 2 CV V37 MS/4 30IN MX69G

## (undated) DEVICE — PK ANT HIP 40

## (undated) DEVICE — MEDI-VAC YANKAUER SUCTION HANDLE W/BULBOUS TIP: Brand: CARDINAL HEALTH

## (undated) DEVICE — GLV SURG SIGNATURE ESSENTIAL PF LTX SZ8

## (undated) DEVICE — SHEET, DRAPE, SPLIT, STERILE: Brand: MEDLINE

## (undated) DEVICE — ADHS SKIN SURG TISS VISC PREMIERPRO EXOFIN HI/VISC FAST/DRY

## (undated) DEVICE — TOWEL,OR,DSP,ST,BLUE,STD,4/PK,20PK/CS: Brand: MEDLINE

## (undated) DEVICE — Device

## (undated) DEVICE — SYR LUERLOK 20CC BX/50

## (undated) DEVICE — TRAP FLD MINIVAC MEGADYNE 100ML

## (undated) DEVICE — TBG PENCL TELESCP MEGADYNE SMOKE EVAC 10FT

## (undated) DEVICE — 3M™ IOBAN™ 2 ANTIMICROBIAL INCISE DRAPE 6640EZ: Brand: IOBAN™ 2

## (undated) DEVICE — BIPOLAR SEALER 23-112-1 AQM 6.0: Brand: AQUAMANTYS™

## (undated) DEVICE — GLV SURG PREMIERPRO ORTHO LTX PF SZ8 BRN

## (undated) DEVICE — MAT FLR ABSORBENT LG 4FT 10 2.5FT

## (undated) DEVICE — SOL ISO/ALC RUB 70PCT 4OZ

## (undated) DEVICE — APPL CHLORAPREP HI/LITE 26ML ORNG

## (undated) DEVICE — DECANTER: Brand: UNBRANDED